# Patient Record
Sex: MALE | Race: BLACK OR AFRICAN AMERICAN | NOT HISPANIC OR LATINO | Employment: UNEMPLOYED | URBAN - METROPOLITAN AREA
[De-identification: names, ages, dates, MRNs, and addresses within clinical notes are randomized per-mention and may not be internally consistent; named-entity substitution may affect disease eponyms.]

---

## 2024-05-29 ENCOUNTER — OFFICE VISIT (OUTPATIENT)
Dept: UROLOGY | Facility: HOSPITAL | Age: 74
End: 2024-05-29

## 2024-05-29 ENCOUNTER — LAB (OUTPATIENT)
Dept: LAB | Facility: LAB | Age: 74
End: 2024-05-29
Payer: COMMERCIAL

## 2024-05-29 DIAGNOSIS — C61 PROSTATE CA (MULTI): ICD-10-CM

## 2024-05-29 DIAGNOSIS — C61 PROSTATE CA (MULTI): Primary | ICD-10-CM

## 2024-05-29 LAB — PSA SERPL-MCNC: 50.68 NG/ML

## 2024-05-29 PROCEDURE — 36415 COLL VENOUS BLD VENIPUNCTURE: CPT

## 2024-05-29 PROCEDURE — 99215 OFFICE O/P EST HI 40 MIN: CPT | Performed by: STUDENT IN AN ORGANIZED HEALTH CARE EDUCATION/TRAINING PROGRAM

## 2024-05-29 PROCEDURE — 84153 ASSAY OF PSA TOTAL: CPT

## 2024-05-29 NOTE — PROGRESS NOTES
Subjective   Patient ID: Binu Winters is a 74 y.o. male    HPI  74 y.o. male who has a history of prostate cancer in 2021 presented for follow up for his condition. In March 2024, Recent PET scan showed local disease extension into seminal vesicles, possible metastatic subcentimetric peter inguinal LNs        Review of Systems    All systems were reviewed. Anything negative was noted in the HPI.    Objective   Physical Exam    General: Well developed, well nourished, alert and cooperative, appears in no acute distress   Eyes: Non-injected conjunctiva, sclera clear, no proptosis   Cardiac: Extremities are warm and well perfused. No edema, cyanosis or pallor   Lungs: Breathing is easy, non-labored. Speaking in clear and complete sentences. Normal diaphragmatic movement   MSK: Ambulatory with steady gait, unassisted   Neuro: Alert and oriented to person, place, and time   Psych: Demonstrates good judgment and reason, without hallucinations, abnormal affect or abnormal behaviors   Skin: No obvious lesions, no rashes       No CVA tenderness bilaterally   No suprapubic pain or discomfort       No past medical history on file.      No past surgical history on file.        Assessment/Plan   Prostatic cancer, local disease progression, possible inguinal Lns, no pathology or Thawville score to review     74 y.o. male who presents for the above condition, We had a very long and extensive discussion with the patient regarding the pathophysiology, differential diagnosis, risk factor, management, natural history, incidence and diagnostic work-up of the condition. Recent PET scan showed local disease extension into seminal vesicles, possible metastatic subcentimetric peter inguinal Lns.    I reviewed with the patient the epidemiology and natural history of prostate cancer, including the different grades and stages including the Thawville scores and the newer grade group classifications. I explained to him that with his focal lesion on  MRI and we need to do a metastatic work-up. I explained to him that such disease is typically managed with either robotic assisted laparoscopic radical prostatectomy, external beam radiation, seeds implants, HIFU.  I explained to him however several other variables can come into play, including the age of the patient, the trend of PSA change, the volume of disease, the comorbidities, and the genomic profile to predict the chance of progression in the future. I explained to him that my preference is against active surveillance with delayed intervention. we discussed the risk, benefit, step-by-step procedure, adverse events, side effects, outcomes, quality of life, and survival benefit of each therapy for the prostate cancer.  More specifically discussed urinary incontinence and rectal dysfunction and oncologic outcomes for each.     Plan:  - PSA  - Fu with Rad onc for possible EBRT        5/29/2024    Scribe Attestation  By signing my name below, Naty LLOYD Scribe   attest that this documentation has been prepared under the direction and in the presence of Dr. Bo Cisneros

## 2024-05-31 ENCOUNTER — TELEPHONE (OUTPATIENT)
Dept: UROLOGY | Facility: HOSPITAL | Age: 74
End: 2024-05-31

## 2024-05-31 ENCOUNTER — TELEPHONE (OUTPATIENT)
Dept: RADIATION ONCOLOGY | Facility: CLINIC | Age: 74
End: 2024-05-31

## 2024-05-31 NOTE — TELEPHONE ENCOUNTER
----- Message from Bo Cisneros MD MPH sent at 5/30/2024 11:48 AM EDT -----  MAYAI  ----- Message -----  From: Lab, Background User  Sent: 5/29/2024  10:17 PM EDT  To: Bo Cisneros MD MPH

## 2024-05-31 NOTE — TELEPHONE ENCOUNTER
The purpose of this encounter was to know if patient can be seen without records.  Should he not be scheduled until imaging orders are in and biopsy records are in?  Appointment has been cancelled.  Please advise on this.     I called the correctional facility and the  said he got a call about this 3 times today.  Once was me and the other 2 times have not been documented.  I was transferred to his officer Tato again who said I need to ask for the medical supervisor.  I was transferred to Blanca who is on lunch and left her a VM with my direct extension and that I have cancelled the appointment.

## 2024-05-31 NOTE — TELEPHONE ENCOUNTER
LM on Jackie Raygoza's voicemail that this patient has an order for radiation/oncology, that an appointment needs to be made; call [phone] which is central scheduling.      Mary Rg LPN

## 2024-05-31 NOTE — TELEPHONE ENCOUNTER
I am from [facility] at [place] not central scheduling and Per Dr. Phma, who pt was originally scheduled by central scheduling with states pt needs to see either Dr. Carter Zapata or Dr. Mehul Ken at the [place] office.   Patient has 0 records on file per Dr. Cisneros's notes.  There is no PET CT on file nor MRI as stated in the notes.  Left multiple messages at correctional facility.  Pt needs imaging and biopsy records for consult with radiation.  Regardless needs to be rescheduled by that location.

## 2024-05-31 NOTE — TELEPHONE ENCOUNTER
[facility] is the closest  rad onc facility to [facility].    Per Dr. Pham, please cancel [place] NPV and reschedule NPV with either Dr. Carter Zapata or Dr. Mehul Ken at the [facility] office.     Thank you,  ASHLEIGH DIEZ, RN

## 2024-05-31 NOTE — TELEPHONE ENCOUNTER
Spoke with ruma Raygoza at [facility].  I informed her of the information needed per Rad/Onc office in [place];  That the Patient needs to schedule with Center office, the providers names were given to her.  All imaging and records related to this diagnosis needs to be forwarded to Center so that patient can be seen.    Mary Rg LPN

## 2024-05-31 NOTE — TELEPHONE ENCOUNTER
"Patient was scheduled by central scheduling yesterday.  He is being ref by Dr. Cisneros in urology at .  His office visit notes state no pathology or Frank score to review.   It also states he has a focal lesion on MRI and Recent PET scan showed local disease.  Imaging not on file.  Patient is at a correctional facility and I left a VM for his \"officer\".     Should I reschedule him if we are not able to reach him to find out about imaging to get it pushed into PACS and to find out if he ever had a biopsy?  Please advise.   "

## 2024-05-31 NOTE — TELEPHONE ENCOUNTER
Including SNEHAL Schroeder to make aware.  Please include her when routing new messages pertaining to this patient.    Thank you,    ASHLEIGH DIEZ RN

## 2024-05-31 NOTE — TELEPHONE ENCOUNTER
I have replied back on Mary Rg's encounter who is the LPN for Dr. Cisneros with the below information that patient needs to be rescheduled to [place] and that there are no records on file.  They will proceed forward with scheduling per their other encounter as they contacted the corrections facility again to r/s.

## 2024-06-03 ENCOUNTER — APPOINTMENT (OUTPATIENT)
Dept: RADIATION ONCOLOGY | Facility: CLINIC | Age: 74
End: 2024-06-03

## 2024-06-03 ENCOUNTER — TELEPHONE (OUTPATIENT)
Dept: RADIATION ONCOLOGY | Facility: CLINIC | Age: 74
End: 2024-06-03

## 2024-06-03 ENCOUNTER — HOSPITAL ENCOUNTER (OUTPATIENT)
Dept: RADIATION ONCOLOGY | Facility: CLINIC | Age: 74
Setting detail: RADIATION/ONCOLOGY SERIES
Discharge: HOME | End: 2024-06-03
Payer: COMMERCIAL

## 2024-06-03 VITALS
HEART RATE: 58 BPM | OXYGEN SATURATION: 98 % | DIASTOLIC BLOOD PRESSURE: 88 MMHG | TEMPERATURE: 97.5 F | RESPIRATION RATE: 18 BRPM | SYSTOLIC BLOOD PRESSURE: 168 MMHG

## 2024-06-03 DIAGNOSIS — C61 PROSTATE CANCER (MULTI): Primary | ICD-10-CM

## 2024-06-03 DIAGNOSIS — C61 PROSTATE CA (MULTI): ICD-10-CM

## 2024-06-03 PROCEDURE — 99205 OFFICE O/P NEW HI 60 MIN: CPT | Performed by: RADIOLOGY

## 2024-06-03 PROCEDURE — 99215 OFFICE O/P EST HI 40 MIN: CPT | Performed by: RADIOLOGY

## 2024-06-03 RX ORDER — ATORVASTATIN CALCIUM 80 MG/1
80 TABLET, FILM COATED ORAL NIGHTLY
COMMUNITY

## 2024-06-03 RX ORDER — CARVEDILOL 6.25 MG/1
6.25 TABLET ORAL
COMMUNITY

## 2024-06-03 RX ORDER — NAPROXEN SODIUM 220 MG/1
81 TABLET, FILM COATED ORAL DAILY
COMMUNITY

## 2024-06-03 RX ORDER — AMLODIPINE BESYLATE 10 MG/1
10 TABLET ORAL DAILY
COMMUNITY

## 2024-06-03 RX ORDER — FUROSEMIDE 40 MG/1
40 TABLET ORAL DAILY
COMMUNITY

## 2024-06-03 ASSESSMENT — ENCOUNTER SYMPTOMS
PSYCHIATRIC NEGATIVE: 1
MYALGIAS: 1
DIFFICULTY URINATING: 1
LEG SWELLING: 1
RESPIRATORY NEGATIVE: 1
GASTROINTESTINAL NEGATIVE: 1
HEMATOLOGIC/LYMPHATIC NEGATIVE: 1
FREQUENCY: 1
ENDOCRINE NEGATIVE: 1
CONSTITUTIONAL NEGATIVE: 1
NUMBNESS: 1
EYES NEGATIVE: 1

## 2024-06-03 ASSESSMENT — PAIN SCALES - GENERAL: PAINLEVEL: 0-NO PAIN

## 2024-06-03 NOTE — PROGRESS NOTES
Radiation Oncology Nursing Note    Prior Radiotherapy:  No  No radiation treatments to show. (Treatments may have been administered in another system.)     Current Systemic Treatment:  No     Presence of Pacemaker or ICD:  No    History of Autoimmune or Connective Tissue Disorders:  No    Pain: The patient's current pain level was assessed.  They report currently having a pain of 0 out of 10.  They feel their pain is under control without the use of pain medications.    Review of Systems:  Review of Systems   Constitutional: Negative.    HENT:  Negative.     Eyes: Negative.  Eye problems: Wears glasses.   Respiratory: Negative.     Cardiovascular:  Positive for leg swelling (On Lasix).   Gastrointestinal: Negative.         Moves bowels twice daily, soft and formed   Endocrine: Negative.    Genitourinary:  Positive for difficulty urinating (Intermittency), frequency and nocturia (x3).    Musculoskeletal:  Positive for myalgias (New onset R hip pain after running x1 week).   Skin: Negative.    Neurological:  Positive for numbness (Mild intermittent L hand).   Hematological: Negative.    Psychiatric/Behavioral: Negative.

## 2024-06-03 NOTE — PROGRESS NOTES
Radiation Oncology Outpatient Consult    Patient Name:  Binu Winters  MRN:  [id number]  :  1950    Primary Care Provider: No primary care provider on file.  Care Team: No care team member to display    Date of Service: 6/3/2024       SUMMARY: 74 y.o. male with high risk prostate cancer, cT3b(r) with possible inguinal LAD on PSMA PET/CT (pending review at ). PSA 50.68    SUBJECTIVE  History of Present Illness:  Binu Winters is a 74 y.o. male who was referred by Bo Cisneros for a consultation to the [facility] Department of Radiation Oncology.  He is presenting for evaluation and management of prostate cancer    Patient was being evalulated at [facility] for elevated PSA. We are working on obtaining all his records. PSMA PET/CT demonstrated T3b disease with possible inguinal LAD    Today, the patient reports feeling well overall. He denies changes in his symptoms.  His sexual Health Inventory for Men score (KALPANA) is 11. His International Prostate Symptom Score (IPSS) score is 11. He denies diarrhea or constipation. He denies bone pain.      Prior Radiotherapy:  No  No radiation treatments to show. (Treatments may have been administered in another system.)      Current Systemic Treatment:  No     Presence of Pacemaker or ICD:  No     History of Autoimmune or Connective Tissue Disorders:  No     Pain: The patient's current pain level was assessed.  They report currently having a pain of 0 out of 10.  They feel their pain is under control without the use of pain medications.     Review of Systems:  Review of Systems   Constitutional: Negative.    HENT:  Negative.     Eyes: Negative.  Eye problems: Wears glasses.   Respiratory: Negative.     Cardiovascular:  Positive for leg swelling (On Lasix).   Gastrointestinal: Negative.         Moves bowels twice daily, soft and formed   Endocrine: Negative.    Genitourinary:  Positive for difficulty urinating (Intermittency), frequency and  nocturia (x3).    Musculoskeletal:  Positive for myalgias (New onset R hip pain after running x1 week).   Skin: Negative.    Neurological:  Positive for numbness (Mild intermittent L hand).   Hematological: Negative.    Psychiatric/Behavioral: Negative.         Past Surgical History:  No past surgical history on file.     Family History:  Cancer-related family history is not on file.    Social History:       Allergies:    Allergies   Allergen Reactions    Penicillins Anaphylaxis        Medications:  No current outpatient medications on file.          Performance Status:  ECOG 1          OBJECTIVE  Physical Exam:  /88 (BP Location: Right arm, Patient Position: Sitting, BP Cuff Size: Large adult)   Pulse 58   Temp 36.4 °C (97.5 °F)   Resp 18   SpO2 98%    Constitutional: Awake, alert and oriented. No acute distress. Appears stated age.  Eyes: Conjunctivae are clear without exudates or hemorrhage. Eyelids are normal in appearance without swelling or lesions.  ENMT: mucous membranes moist, no apparent injury, no lesions seen  Neck: Neck supple, no apparent injury, trachea midline  Resp/Thorax: Patent airways,  good chest expansion. Thorax symmetric  Cardiovascular: The external chest is normal without lifts, heaves, or thrills. PMI is not visible.  GI: Nondistended, soft, non-tender.  /Gyn: Deferred  MSK: No tenderness noted on palpation of the spine. No ROM limitations.  Extremities: normal extremities, no cyanosis, erythema or edema.  Neurological: alert and oriented x3. Sensory and motor function appear intact. No gait abnormality observed.  Psych: Appropriate mood and behavior.  Skin: Warm and dry, no new lesions or rashes.      Laboratory Review:      Lab Results   Component Value Date    PSA 50.68 (H) 05/29/2024       The pertinent lab results were reviewed and discussed with the patient.      Pathology Review:  obtaining [facility] records    Imaging:  The pertinent imaging results were reviewed and  discussed with the patient.  Notably,            ASSESSMENT:  Binu Winters is a 74 y.o. male with high risk prostate cancer    COUNSELING AND COORDINATION OF CARE:  The natural history of prostate cancer was reviewed with the patient. Prognostic factors including the following were discussed: clinical staging, radiologic findings, pretreatment PSA, GS/grade group on biopsy, and the number of biopsy cores involved with cancer, cancer volume, and the patient's performance status/comorbidities.    The patient's the clinical presentation, PSA lab findings and imaging findings were presented. Based on his risk factors, he would be classified as high risk.    We discussed with the patient treatment options including radical prostatectomy, radiation therapy including hypofractionated/ultrahypofractionated radiation therapy with long term androgen deprivation therapy  Potential benefit, side effects and complications of each treatment was discussed.    The indications, benefits, side effects and complications of radiotherapy, which include both acute and late side effects were highlighted. Acute: Fatigue, dysuria, frequency, urine retention, rectal urgency, diarrhea. Late: Stricture, cystitis, proctitis, sexual dysfunction, second malignancy have all been discussed in detail with the patient. All questions were answered to the patient´s satisfaction.    We discussed the role of fiducials and SpaceOAR for radiotherapy to the prostate.  We also discussed the benefit of SpaceOAR for all patients undergoing radiation therapy to help spare rectal toxicity, , especially for treatment with SBRT.     The patient had all questions answered and concerns addressed during the visit. The patient appears to be leaning towards radiotherapy at this time and would like to initiate preparation for treatment.     PLAN:    Radiation Intent to treat orders have been placed in Epic   Will Refer the patient to Dr Bartlett/Benny at [facility] to  receive radiation closer to [place]  Will place referral for medical oncology  Will obtain [facility] imaging and path        NCCN Guidelines were applicable to guide this patients treatment plan.   Mirza Pham MD       No future appointments.

## 2024-06-03 NOTE — TELEPHONE ENCOUNTER
"This patient came to us without records.       Dr. Pham would like for you to contact [facility] to have the PSMA PET/CT pushed into PACS.  It looks like it was imaging was completed on 3/7/24.     Dr. Pham would also like this patient scheduled as a NPV with [facility] [place].  He will likely go to [place] for radiation treatments.     Per [facility] rules, \"Schedule any future/follow up appointments by contacting:    [facility] Central [phone] opt. 3\"    Scheduling Lead: Eileen [place]  Email: [email or web address]  Phone: [phone] opt. 3  Fax: [phone]    Central Scheduling Email: [email or web address]    Schedulers:  Jackie Raygoza: [phone]  Jovana Hyman: [phone]  Diann Sheth: [phone]  Elif Menjivar: [phone]  Vidhya Castaneda: [phone]    Case Management: [phone]      Thank you,  ASHLEIGH DIEZ RN        "

## 2024-06-03 NOTE — TELEPHONE ENCOUNTER
Per Dr. Pham, patient needs to be seen in [place] with Dr. Zapata or Dr. Bartlett, as [place] is closer.     ASHLEIGH DIEZ RN

## 2024-06-04 ENCOUNTER — PATIENT OUTREACH (OUTPATIENT)
Dept: HEMATOLOGY/ONCOLOGY | Facility: HOSPITAL | Age: 74
End: 2024-06-04
Payer: COMMERCIAL

## 2024-06-04 NOTE — PROGRESS NOTES
06/04/2024 11:29AM Referral received from Dr. Pham for patient to meet with med/onc for his prostate cancer. Only information is via a few records in the sytem. Notes state he was diagnosed in 2021, possibly at [facility] yet, nothing populated in EPIC. Have reached out manually for information. Most recently he had a PET [facility] at [facility] in March of 2024. PSA from May was 50.68. Have requested his records from [facility] and PSMA PET report and image from [facility] as the one in EPIC is hard to read. He is scheduled for 06/06/2024 @ 4:00PM with Dr. Arana. Justin Dominguez RN.     06/06/2024 0606A: Received records from [facility] for Mr. Winters. He did undergo prostate biopsy 11/11/19, pathology showed [place] 8 (4+4, 3+5) disease. PSA at diagnosis was 27.85. Records have been scanned into EPIC. It is unclear from the records received whether or not patient was treated for his prostate cancer at his follow-up visit. Justin Dominguez RN.

## 2024-06-19 ENCOUNTER — TELEPHONE (OUTPATIENT)
Dept: RADIATION ONCOLOGY | Facility: CLINIC | Age: 74
End: 2024-06-19
Payer: COMMERCIAL

## 2024-06-19 NOTE — TELEPHONE ENCOUNTER
"Left VM at [facility] [phone] opt. 3\"     Scheduling Lead: Eileen [place]  Email: [email or web address]  Phone: [phone] opt. 3  Fax: [phone]    To schedule referral to medical oncology.    "

## 2024-06-20 NOTE — TELEPHONE ENCOUNTER
All the numbers randy provided for scheduling including below go directly to .  Left  again and let Cruz at [facility] know to get pt scheduled for hematology/oncology referral when pt comes for his 7/1 appt.

## 2024-06-22 NOTE — PROGRESS NOTES
Oncology New Patient Visit  Patient ID: Binu Winters is a 74 y.o. male who presents today to [facility] care.  Referring Physician: Mirza Pham MD  [id number]  [place] [address]  Primary Care Provider: No Assigned PCP Generic Provider, MD Mirza Pham rad onc  [facility]    [facility]      Cancer History  Prostate Cancer  - stage TBD  Treatment  -dx in 2019/2020, limited records, PSA in the 20 range biopsy showing grade group 2, grade group 4, grade group 3 did not have diagnostic imaging at that time decision to monitor  -Rising PSA now up to 57 PET scan done in March showing possible extension from the prostate into the seminal vesicles nonspecific renal lymphadenopathy with SUV 3-4 range    Other contributing history  CHF, hypertension, hyperlipidemia, erectile dysfunction, alcohol abuse, CAD,     HPI  Referred by provider/s above.  The patient is accompanied by guards and is without any other major new complaints.  Is any ongoing issues with nausea, vomiting, fevers, chills, easy bruising or bleeding denies any issues with chest pain or chest tightness denies any melena or bright red blood per rectum.  Appetite and energy level are stable.  Denies any worsening issues with muscle aches or tenderness.     ROS:  10-pt ROS reviewed and negative except as mentioned above.    Medications: below was reviewed and is accurate  Current Outpatient Medications   Medication Instructions    amLODIPine (NORVASC) 10 mg, oral, Daily    aspirin 81 mg, oral, Daily    atorvastatin (LIPITOR) 80 mg, oral, Nightly    carvedilol (COREG) 6.25 mg, oral, 2 times daily (morning and late afternoon)    furosemide (LASIX) 40 mg, oral, Daily        Past Medical History  No past medical history on file.  HTN/ HLD/ CAD /    FamilyHistory  No family history on file.   Past Surgical History  No past surgical history on file.  Right foot /  "vicente    Social History  Single / [facility]  Etoh abuse    He  reports that he does not currently use alcohol.  He  reports no history of drug use.    Physical exam:  Vitals:    06/24/24 1408   BP: 160/88   BP Location: Right arm   Patient Position: Sitting   BP Cuff Size: Adult long   Pulse: 60   Resp: 18   Temp: 35.9 °C (96.6 °F)   TempSrc: Temporal   SpO2: 99%   Weight: 99.3 kg (218 lb 14.7 oz)   Height: (S) 1.705 m (5' 7.13\")     GEN: NAD, well-appearing sitting in a wheel chair  HEENT: no clear lesions seen  NECK: no clear masses  LYMPH: no palpable adenopathy  SKIN: no concerning new lesions  LUNGS: CTA  CV: RRR no clear R/G  ABD: Soft, NTND. No rebound or guarding.  EXT:  trace edema bilaterally   NEURO: Grossly intact. No focal deficits.  PSYCH: Normal mood and affect    Radiology review  Reviewed in detail personally and for detail see results in EPIC        Genomics Data    Laboratory review  Reviewed in detail personally and for detail see results in EPIC            ASSESSMENT AND PLAN   We had a long discussion regarding the natural history, prognosis, and potential treatment options for his disease.  We discussed timing of therapy and next line standard options as well as clinical trial options for his current disease state. Discussed also NCCN guidlines as per the patients diagnosis and treatment options.  The Total Visit includes the following :  face to face time during the visit today if in person, phone / virtual time with the patient,  review of records, including office notes, pathology, radiology, labs, and prior treatments and care coordination. This review directly influenced my assessment and recommendations for this visit.    Prostate cancer- discussed in detail concern for high risk disease neal the need for a repeat PET scan to further evaluate the extent of his disease.  Discussed in detail options at this juncture including surgery, definitive " radiation with the use of ADT discussed multiple ADT options including Relugolix potential role of other agents such as the addition of adding novel hormonal agent such as abiraterone and prednisone.  Risks benefits discussed in detail.  Discussed natural history of this cancer and treatment options.  Discussed ASE and rba of ADT therapy including hot flashes, bone density changes, body habitus changes and loss of muscle mass, loss of libido, and ED.  We also discussed orcheictomy as an alternative to medical castration.  He agrees to start with medical castration for now and discussed the need to maintain adequate cardiovascular health, exercise, bone health with calcium 1000 mg with vitamin D 400-800 international units.  I do have some concerns with his last PET scan being close to 3 months and his PSA continue to rise.  After extensive discussion he still has not had follow-up with radiation oncology and is scheduled to follow-up with them fairly soon.  I recommendation was to go ahead and get follow-up PET scan and he is scheduled already for follow-up with radiation oncology and we will arrange follow-up then afterwards next week to discuss findings discussed likely the backbone of treatment would be ADT will need to work out logistics if found to have high risk features for potential kwaku involvement about how to get the abiraterone approved.  And be able to transported back and forth to the facility.We will arrange follow-up and then discuss next steps.        James Stahl MD  Senior Attending Physician/  in Medicine [facility] [employer]  [facility]  Patient line [phone]  Fax [phone]  \

## 2024-06-24 ENCOUNTER — OFFICE VISIT (OUTPATIENT)
Dept: HEMATOLOGY/ONCOLOGY | Facility: CLINIC | Age: 74
End: 2024-06-24
Payer: COMMERCIAL

## 2024-06-24 VITALS
BODY MASS INDEX: 34.36 KG/M2 | SYSTOLIC BLOOD PRESSURE: 160 MMHG | RESPIRATION RATE: 18 BRPM | DIASTOLIC BLOOD PRESSURE: 88 MMHG | WEIGHT: 218.92 LBS | OXYGEN SATURATION: 99 % | HEIGHT: 67 IN | TEMPERATURE: 96.6 F | HEART RATE: 60 BPM

## 2024-06-24 DIAGNOSIS — C61 PROSTATE CANCER (MULTI): ICD-10-CM

## 2024-06-24 PROCEDURE — 99215 OFFICE O/P EST HI 40 MIN: CPT | Performed by: INTERNAL MEDICINE

## 2024-06-24 PROCEDURE — 1159F MED LIST DOCD IN RCRD: CPT | Performed by: INTERNAL MEDICINE

## 2024-06-24 PROCEDURE — 99205 OFFICE O/P NEW HI 60 MIN: CPT | Performed by: INTERNAL MEDICINE

## 2024-06-24 PROCEDURE — 1036F TOBACCO NON-USER: CPT | Performed by: INTERNAL MEDICINE

## 2024-06-24 ASSESSMENT — COLUMBIA-SUICIDE SEVERITY RATING SCALE - C-SSRS
6. HAVE YOU EVER DONE ANYTHING, STARTED TO DO ANYTHING, OR PREPARED TO DO ANYTHING TO END YOUR LIFE?: NO
2. HAVE YOU ACTUALLY HAD ANY THOUGHTS OF KILLING YOURSELF?: NO
1. IN THE PAST MONTH, HAVE YOU WISHED YOU WERE DEAD OR WISHED YOU COULD GO TO SLEEP AND NOT WAKE UP?: NO

## 2024-06-24 ASSESSMENT — ENCOUNTER SYMPTOMS
LOSS OF SENSATION IN FEET: 0
DEPRESSION: 0
OCCASIONAL FEELINGS OF UNSTEADINESS: 0

## 2024-06-24 ASSESSMENT — PATIENT HEALTH QUESTIONNAIRE - PHQ9
1. LITTLE INTEREST OR PLEASURE IN DOING THINGS: NOT AT ALL
2. FEELING DOWN, DEPRESSED OR HOPELESS: NOT AT ALL
SUM OF ALL RESPONSES TO PHQ9 QUESTIONS 1 AND 2: 0

## 2024-06-24 NOTE — PROGRESS NOTES
Patient here for new patient consult.   History of prostate Cancer and new elevation in PSA.  Follow up re options and review of case.     Oriented to this facility and information given re how to contact this facility and physician.     Patient here with armed guards as required in this situation.   Hand outs re prescribed treatment given to patient.     Medications and Allergies reviewed and reconciled this visit.    Follow up per MD request.    Patient reports currently not using mychart, Reviewed this is a good place to communicate with the team as well as review labs and upcoming orders.      No barriers to education noted, patient agrees to current plan and verbalized understanding using teach back method.

## 2024-06-28 PROBLEM — C61 PROSTATE CANCER (MULTI): Status: RESOLVED | Noted: 2024-06-03 | Resolved: 2024-06-28

## 2024-07-01 ENCOUNTER — APPOINTMENT (OUTPATIENT)
Dept: RADIATION ONCOLOGY | Facility: CLINIC | Age: 74
End: 2024-07-01
Payer: COMMERCIAL

## 2024-07-01 ENCOUNTER — TELEPHONE (OUTPATIENT)
Dept: HEMATOLOGY/ONCOLOGY | Facility: CLINIC | Age: 74
End: 2024-07-01
Payer: COMMERCIAL

## 2024-07-01 NOTE — TELEPHONE ENCOUNTER
Attempted to call back as PET and f/up not arranged  [facility] schedule as he was due for a PET scan for an updated PET scan since it has been 3 months and to see us today in the clinic follow-up appointments were not scheduled.  Did leave a message at .

## 2024-07-03 ENCOUNTER — APPOINTMENT (OUTPATIENT)
Dept: RADIATION ONCOLOGY | Facility: CLINIC | Age: 74
End: 2024-07-03
Payer: COMMERCIAL

## 2024-07-08 ENCOUNTER — HOSPITAL ENCOUNTER (OUTPATIENT)
Dept: RADIOLOGY | Facility: HOSPITAL | Age: 74
Discharge: HOME | End: 2024-07-08
Payer: COMMERCIAL

## 2024-07-08 DIAGNOSIS — C61 PROSTATE CANCER (MULTI): ICD-10-CM

## 2024-07-08 PROCEDURE — 78815 PET IMAGE W/CT SKULL-THIGH: CPT | Mod: PET TUMOR SUBSQ TX STRATEGY | Performed by: STUDENT IN AN ORGANIZED HEALTH CARE EDUCATION/TRAINING PROGRAM

## 2024-07-08 PROCEDURE — A9800 HC RX 343 DIAGNOSTIC RADIOPHARMACEUTICALS: HCPCS | Performed by: INTERNAL MEDICINE

## 2024-07-08 PROCEDURE — 78815 PET IMAGE W/CT SKULL-THIGH: CPT | Mod: PS

## 2024-07-08 PROCEDURE — 3430000001 HC RX 343 DIAGNOSTIC RADIOPHARMACEUTICALS: Performed by: INTERNAL MEDICINE

## 2024-07-11 ENCOUNTER — HOSPITAL ENCOUNTER (OUTPATIENT)
Dept: RADIOLOGY | Facility: EXTERNAL LOCATION | Age: 74
Discharge: HOME | End: 2024-07-11

## 2024-07-11 DIAGNOSIS — C61 PROSTATE CANCER (MULTI): ICD-10-CM

## 2024-07-12 ENCOUNTER — HOSPITAL ENCOUNTER (OUTPATIENT)
Dept: RADIATION ONCOLOGY | Facility: CLINIC | Age: 74
Setting detail: RADIATION/ONCOLOGY SERIES
Discharge: HOME | End: 2024-07-12
Payer: COMMERCIAL

## 2024-07-12 VITALS
SYSTOLIC BLOOD PRESSURE: 193 MMHG | WEIGHT: 224.1 LBS | DIASTOLIC BLOOD PRESSURE: 93 MMHG | RESPIRATION RATE: 16 BRPM | HEART RATE: 69 BPM | OXYGEN SATURATION: 98 % | TEMPERATURE: 97.7 F | BODY MASS INDEX: 34.97 KG/M2

## 2024-07-12 DIAGNOSIS — C61 PROSTATE CANCER (MULTI): Primary | ICD-10-CM

## 2024-07-12 PROCEDURE — 99214 OFFICE O/P EST MOD 30 MIN: CPT | Performed by: RADIOLOGY

## 2024-07-12 SDOH — ECONOMIC STABILITY: TRANSPORTATION INSECURITY
IN THE PAST 12 MONTHS, HAS THE LACK OF TRANSPORTATION KEPT YOU FROM MEDICAL APPOINTMENTS OR FROM GETTING MEDICATIONS?: NO

## 2024-07-12 SDOH — ECONOMIC STABILITY: TRANSPORTATION INSECURITY
IN THE PAST 12 MONTHS, HAS LACK OF TRANSPORTATION KEPT YOU FROM MEETINGS, WORK, OR FROM GETTING THINGS NEEDED FOR DAILY LIVING?: NO

## 2024-07-12 ASSESSMENT — SOCIAL DETERMINANTS OF HEALTH (SDOH): HOW HARD IS IT FOR YOU TO PAY FOR THE VERY BASICS LIKE FOOD, HOUSING, MEDICAL CARE, AND HEATING?: NOT HARD AT ALL

## 2024-07-12 ASSESSMENT — PAIN SCALES - GENERAL: PAINLEVEL: 0-NO PAIN

## 2024-07-12 NOTE — PROGRESS NOTES
Patient seen for follow up for prostate cancer, KALPANA 2, IPSS 1 for weak stream. Patient prepared for CT/Simulation appointment. Per Dr. Bartlett patient to have MRI before radiation treatment begins.

## 2024-07-12 NOTE — PROGRESS NOTES
Radiation Oncology Follow-Up    Patient Name:  Binu Winters  MRN:  [id number]  :  1950    Referring Provider: No ref. provider found  Primary Care Provider: No Assigned PCP Generic Provider, MD  Care Team: Patient Care Team:  No Assigned Pcp Generic Provider, MD as PCP - General (General Practice)  James Stahl MD as Consulting Physician (Hematology and Oncology)    Date of Service: 2024    SUBJECTIVE  History of Present Illness:  Binu Winters is a 74 y.o. male who was previously seen at the [facility] Department of Radiation Oncology for prostate ca.    Mr Henderson had seen Dr Pham for his prostate ca in 2024, and he was referred to [facility] for RT. Since meeting Dr Pham, he has had a PSMA PET, report below. Today he feels well. He is accompanied by 2 guards. KALPANA 2. IPSS 1. QOL 1. On my review of the PET, there is no obvious infiltration of the rectum. The inguinal LN that was previously noted seems to be a red herring -- it is not reported as disease and appears normal.    Treatment Rendered:   No radiation treatments to show. (Treatments may have been administered in another system.)       Review of Systems:   Review of Systems - Oncology    Performance Status:   The Karnofsky performance scale today is 100, Fully active, able to carry on all pre-disease performed without restriction (ECOG equivalent 0).   General: Negative for weight changes, night sweats, fever, recent trauma.   Eyes: Negative for blurriness, eye pain, blind spots.   Ears, nose, mouth, throat: Negative for changes in hearing, changes in taste, sore throat, mouth sores.  Cardiovascular: Negative for palpitations, chest pain, tightness.   Pulmonary: Negative for shortness of breath, cough, sputum.   Gastrointestinal: Negative for indigestion, diarrhea, constipation, abdominal pain, blood in stool, nausea/vomiting.   Genitourinary: Per HPI.   Neuro: Negative for numbness,  tingling, weakness, changes in speech.   Musculoskeletal: Negative for muscle pain, joint pain, edema, tenderness.   Endocrine: Negative for hot flashes.   Other: All others negative.      OBJECTIVE  Vital Signs:  BP (!) 193/93 (BP Location: Left arm, Patient Position: Sitting, BP Cuff Size: Large adult)   Pulse 69   Temp 36.5 °C (97.7 °F) (Temporal)   Resp 16   Wt 102 kg (224 lb 1.6 oz)   SpO2 98%   BMI 34.97 kg/m²   Physical Exam   General: The patient appears well and is in no acute distress.   Cognition: Acceptable understanding of the essential elements of the medical situation.   Speech: Fluent and coherent.   Eyes: No conjunctival infection. Anicteric. Extraocular movements intact.   Head, ears, nose, mouth, throat: Atraumatic. Moist mucous membranes. Trachea midline.   Lymphatic: No visible cervical lymphadenopathy.   Cardiovascular: No visible jugular venous distention. Skin perfused.   Pulmonary: Breathes comfortably on room air without stridor or cough.   Abdomen: Nondistended.   Extremities: No edema or muscle wasting.   Neurologic: Cranial nerves II-XII are grossly intact. Moves all extremities. No gross focal deficits.   Psychiatric: Mood is appropriate.   Skin: No visible rash or ulcers.       Imaging    1. Intense Ga68 PSMA uptake involving the majority of the prostate  gland, with extension into the seminal vesicles, consistent with  known prostate cancer with local spread. PSMA-RADS-5 questionable  extension of Ga68 PSMA uptake within the anterolateral wall of the  rectum, with likely obliteration of the fat planes, although  incompletely evaluated on low-dose CT. Recommend correlation with MRI  of the pelvis to rule out rectal involvement.  2. No PET evidence of kwaku or distant metastasis      Pathology      ASSESSMENT:   Binu Winters is a 74 y.o. male with prostate cancer. Stage III cT2 (rT3a/b) N0 M0 GG4 6/6 cores PSA 50.     PLAN:    We will plan to proceed with CT simulation for  prostate ca today.    We reviewed the logistics of the treatment.    He is seeing Dr Stahl next week, with tentative plan to start ADT about 1 week later.    I am ordering an MRI prostate since he hasn't had this before, for staging, and to use for RT planning.    Mehul Bartlett MD MS   Department of Radiation Oncology

## 2024-07-12 NOTE — PROGRESS NOTES
Radiation Oncology Nursing Note    Pain: The patient's current pain level was assessed.  They report currently having a pain of 0 out of 10.  They feel their pain is under control without the use of pain medications.    Review of Systems:  Review of Systems - Oncology

## 2024-07-15 ENCOUNTER — OFFICE VISIT (OUTPATIENT)
Dept: HEMATOLOGY/ONCOLOGY | Facility: CLINIC | Age: 74
End: 2024-07-15
Payer: COMMERCIAL

## 2024-07-15 VITALS
TEMPERATURE: 95.9 F | HEART RATE: 51 BPM | WEIGHT: 223.11 LBS | DIASTOLIC BLOOD PRESSURE: 92 MMHG | SYSTOLIC BLOOD PRESSURE: 167 MMHG | BODY MASS INDEX: 34.81 KG/M2 | OXYGEN SATURATION: 99 % | RESPIRATION RATE: 16 BRPM

## 2024-07-15 DIAGNOSIS — C61 PROSTATE CANCER (MULTI): Primary | ICD-10-CM

## 2024-07-15 PROCEDURE — 99214 OFFICE O/P EST MOD 30 MIN: CPT | Performed by: INTERNAL MEDICINE

## 2024-07-15 PROCEDURE — 1159F MED LIST DOCD IN RCRD: CPT | Performed by: INTERNAL MEDICINE

## 2024-07-15 PROCEDURE — 1126F AMNT PAIN NOTED NONE PRSNT: CPT | Performed by: INTERNAL MEDICINE

## 2024-07-15 RX ORDER — FAMOTIDINE 10 MG/ML
20 INJECTION INTRAVENOUS ONCE AS NEEDED
OUTPATIENT
Start: 2024-07-23

## 2024-07-15 RX ORDER — DIPHENHYDRAMINE HYDROCHLORIDE 50 MG/ML
50 INJECTION INTRAMUSCULAR; INTRAVENOUS AS NEEDED
OUTPATIENT
Start: 2024-07-23

## 2024-07-15 RX ORDER — ALBUTEROL SULFATE 0.83 MG/ML
3 SOLUTION RESPIRATORY (INHALATION) AS NEEDED
OUTPATIENT
Start: 2024-07-23

## 2024-07-15 RX ORDER — EPINEPHRINE 0.3 MG/.3ML
0.3 INJECTION SUBCUTANEOUS EVERY 5 MIN PRN
OUTPATIENT
Start: 2024-07-23

## 2024-07-15 ASSESSMENT — PATIENT HEALTH QUESTIONNAIRE - PHQ9
2. FEELING DOWN, DEPRESSED OR HOPELESS: NOT AT ALL
1. LITTLE INTEREST OR PLEASURE IN DOING THINGS: NOT AT ALL
SUM OF ALL RESPONSES TO PHQ9 QUESTIONS 1 AND 2: 0

## 2024-07-15 ASSESSMENT — COLUMBIA-SUICIDE SEVERITY RATING SCALE - C-SSRS
2. HAVE YOU ACTUALLY HAD ANY THOUGHTS OF KILLING YOURSELF?: NO
1. IN THE PAST MONTH, HAVE YOU WISHED YOU WERE DEAD OR WISHED YOU COULD GO TO SLEEP AND NOT WAKE UP?: NO
6. HAVE YOU EVER DONE ANYTHING, STARTED TO DO ANYTHING, OR PREPARED TO DO ANYTHING TO END YOUR LIFE?: NO

## 2024-07-15 ASSESSMENT — PAIN SCALES - GENERAL: PAINLEVEL: 0-NO PAIN

## 2024-07-15 NOTE — PROGRESS NOTES
Patient here for follow prostate cancer 3 weeks post PET scan.    No concerns or complaints noted at this time.     Patient here with armed guards per protocol.     Medications and Allergies reviewed and reconciled this visit.    Follow up per MD request. Scheduled follow up for the patient and guards retuning schedule to the facility to determine transport.   Copy of AVS printed.  Information re Eligard and abiraterone reviewed and given.     Patient does not have access to Mychart due to current circumstance. Copy of after visit reports given to Guards per protocol.     No barriers to education noted, patient agrees to current plan and verbalized understanding using teach back method.

## 2024-07-15 NOTE — PROGRESS NOTES
Oncology New Patient Visit  Patient ID: Binu Winters is a 74 y.o. male who presents today to [facility] care.  Referring Physician: No referring provider defined for this encounter.  Primary Care Provider: No Assigned PCP Generic Provider, MD Mirza Pham rad onc  [facility]    [place] [id number] [phone]    Cancer History  Prostate Cancer  - Very High risk -   Treatment  -dx in 2019/2020, limited records, PSA in the 20 range biopsy showing grade group 2, grade group 4, grade group 3 did not have diagnostic imaging at that time decision to monitor  -Rising PSA now up to 57 PET scan done in March showing possible extension from the prostate into the seminal vesicles nonspecific renal lymphadenopathy with SUV 3-4 range  -PSA 5/29/24 50.68  -PET PSMA - 7/8/24 -intense uptake involving the majority of the prostate gland with extension into the seminal vesicles questionable extension within the anterior lateral wall of the rectum with likely obliteration of the flap planes no other evidence of PET distant metastases    Other contributing history  CHF, hypertension, hyperlipidemia, erectile dysfunction, alcohol abuse, CAD,     HPI  Presents for follow up.  He is overall doing well without any other major new symptoms.  Denies any worsening issues with nausea, vomiting, fevers, chills, easy bruising or bleeding denies any chest pain or chest tightness appetite and energy is otherwise stable    ROS:  10-pt ROS reviewed and negative except as mentioned above.    Medications: below was reviewed and is accurate  Current Outpatient Medications   Medication Instructions    amLODIPine (NORVASC) 10 mg, oral, Daily    aspirin 81 mg, oral, Daily    atorvastatin (LIPITOR) 80 mg, oral, Nightly    carvedilol (COREG) 6.25 mg, oral, 2 times daily (morning and late afternoon)    furosemide (LASIX) 40 mg, oral, Daily        Past Medical History / social surgical family reviewed and updated    Physical  exam:  Vitals:    07/15/24 1443   BP: (!) 167/92   BP Location: Right arm   Patient Position: Sitting   BP Cuff Size: Adult long   Pulse: 51   Resp: 16   Temp: 35.5 °C (95.9 °F)   TempSrc: Temporal   SpO2: 99%   Weight: 101 kg (223 lb 1.7 oz)       GEN: NAD, well-appearing sitting in a wheel chair in hand cuffs  HEENT: no clear lesions seen  NECK: no clear masses  LYMPH: no palpable adenopathy  SKIN: no concerning new lesions  LUNGS: CTA  CV: RRR no clear R/G  ABD: Soft, NTND. No rebound or guarding.  EXT:  trace edema bilaterally   NEURO: Grossly intact. No focal deficits.  PSYCH: Normal mood and affect    Radiology review  Reviewed in detail personally and for detail see results in EPIC        Genomics Data    Laboratory review  Reviewed in detail personally and for detail see results in [facility]      ASSESSMENT AND PLAN     Prostate cancer-discussed in detail does have very aggressive locally disease with no evidence of distant metastases will get some blood work next week when getting ADT and discussed the backbone of treatment with radiation and radiation planning is also ongoing discussed also the role of ADT risk benefits and alternatives and complications we will arrange for ADT to start next week.  Also discussed the potential role of adding abiraterone and prednisone based on his PSA based on high risk features not quite sure how we can facilitate and and work with coordinating throughout the present system to get access to the medication.  Did also update radiation oncology regarding PET scan findings and rationale for further MRI testing and workup prior to plan moving forward.  All options discussed in detail with the patient we will arrange for ADT, recommendation was also given for calcium 1000 mg and vitamin D and will arrange follow-up in roughly 1 month with labs prior.  Continue to monitor dose-limiting toxicities.  Hypertension-continue to monitor closely and follow-up with primary team at the senior living  system.    discussed need while on ADT  maintain adequate cardiovascular health, exercise, dietary modifications,  bone health with calcium 1000 mg with vitamin D 400-800 international units        James Stahl MD  Senior Attending Physician/  in Medicine [facility] [employer]  [facility]  Patient line [phone]  Fax [phone]  \

## 2024-07-18 ENCOUNTER — TELEPHONE (OUTPATIENT)
Dept: HEMATOLOGY/ONCOLOGY | Facility: CLINIC | Age: 74
End: 2024-07-18
Payer: COMMERCIAL

## 2024-07-18 ENCOUNTER — APPOINTMENT (OUTPATIENT)
Dept: RADIOLOGY | Facility: HOSPITAL | Age: 74
End: 2024-07-18
Payer: COMMERCIAL

## 2024-07-18 NOTE — TELEPHONE ENCOUNTER
Discussed and nursing team prefers doctor to doctor communication  Updated number and given information  Doctor will call back to discuss  Planned Eligard 45mg injection subcutaneous

## 2024-07-18 NOTE — TELEPHONE ENCOUNTER
Spoke to Enma.   At [facility] wants to administer the Eligard there.  Is tht acceptable to Dr. Stahl?  I will discuss with Dr. Stahl.  If so, fax orders to [phone].

## 2024-07-19 ENCOUNTER — TELEPHONE (OUTPATIENT)
Dept: HEMATOLOGY/ONCOLOGY | Facility: CLINIC | Age: 74
End: 2024-07-19
Payer: COMMERCIAL

## 2024-07-23 ENCOUNTER — HOSPITAL ENCOUNTER (OUTPATIENT)
Dept: RADIOLOGY | Facility: HOSPITAL | Age: 74
Discharge: HOME | End: 2024-07-23
Payer: COMMERCIAL

## 2024-07-23 DIAGNOSIS — C61 PROSTATE CANCER (MULTI): ICD-10-CM

## 2024-07-24 ENCOUNTER — APPOINTMENT (OUTPATIENT)
Dept: HEMATOLOGY/ONCOLOGY | Facility: CLINIC | Age: 74
End: 2024-07-24
Payer: COMMERCIAL

## 2024-07-26 ENCOUNTER — APPOINTMENT (OUTPATIENT)
Dept: RADIOLOGY | Facility: HOSPITAL | Age: 74
End: 2024-07-26
Payer: COMMERCIAL

## 2024-08-07 ENCOUNTER — HOSPITAL ENCOUNTER (OUTPATIENT)
Dept: RADIATION ONCOLOGY | Facility: CLINIC | Age: 74
Setting detail: RADIATION/ONCOLOGY SERIES
Discharge: HOME | End: 2024-08-07
Payer: COMMERCIAL

## 2024-08-07 PROCEDURE — 77338 DESIGN MLC DEVICE FOR IMRT: CPT | Performed by: RADIOLOGY

## 2024-08-07 PROCEDURE — 77301 RADIOTHERAPY DOSE PLAN IMRT: CPT | Performed by: RADIOLOGY

## 2024-08-07 PROCEDURE — 77300 RADIATION THERAPY DOSE PLAN: CPT | Performed by: RADIOLOGY

## 2024-08-18 NOTE — PROGRESS NOTES
Oncology New Patient Visit  Patient ID: Binu Winters is a 74 y.o. male who presents today to [facility] care.  Referring Physician: James Stahl [place]  [id number] [place] [id number]  Primary Care Provider: No Assigned PCP Generic Provider, MD Mirza Pham rad onc  [facility]    [facility]      Cancer History  Prostate Cancer  - Very High risk -   Treatment  -dx in 2019/2020, limited records, PSA in the 20 range biopsy showing grade group 2, grade group 4, grade group 3 did not have diagnostic imaging at that time decision to monitor  -Rising PSA now up to 57 PET scan done in March showing possible extension from the prostate into the seminal vesicles nonspecific renal lymphadenopathy with SUV 3-4 range  -PSA 5/29/24 50.68  -PET PSMA - 7/8/24 -intense uptake involving the majority of the prostate gland with extension into the seminal vesicles questionable extension within the anterior lateral wall of the rectum with likely obliteration of the flap planes no other evidence of PET distant metastases  -[facility]ed [facility]T [facility] at [facility] injection 7/18/24 Eligard for 2 yrs, discussing marcos/pred  -XRT starting 8/19/24    Other contributing history  CHF, hypertension, hyperlipidemia, erectile dysfunction, alcohol abuse, CAD,     HPI  Presents for follow up.  The patient notes no major complications from the hormone shot he is accompanied by his guards from the [facility].  He tolerated the hormone shot without a any major side effects.  Denies any nausea, vomiting, fevers, chills, easy bruising or bleeding denies any other worsening urinary symptoms.  He is planning on starting radiation today    ROS:  10-pt ROS reviewed and negative except as mentioned above.    Medications: below was reviewed and is accurate  Current Outpatient Medications   Medication Instructions    amLODIPine (NORVASC) 10 mg, oral, Daily    aspirin 81 mg, oral, Daily     "atorvastatin (LIPITOR) 80 mg, oral, Nightly    carvedilol (COREG) 6.25 mg, oral, 2 times daily (morning and late afternoon)    furosemide (LASIX) 40 mg, oral, Daily        Past Medical History / social surgical family reviewed and updated    Physical exam:  There were no vitals filed for this visit.      GEN: NAD, well-appearing sitting in a wheel chair in hand cuffs  HEENT: no clear lesions seen  NECK: no clear masses  LYMPH: no palpable adenopathy  SKIN: no concerning new lesions  LUNGS: CTA  CV: RRR no clear R/G  ABD: Soft, NTND. No rebound or guarding.  EXT:  trace edema bilaterally   NEURO: Grossly intact. No focal deficits.  PSYCH: Normal mood and affect    Radiology review  Reviewed in detail personally and for detail see results in EPIC        Genomics Data    Laboratory review  Reviewed in detail personally and for detail see results in [facility]  Lab Results   Component Value Date    WBC 5.9 08/19/2024    HGB 13.3 (L) 08/19/2024    HCT 42.4 08/19/2024    MCV 87 08/19/2024     08/19/2024     Lab Results   Component Value Date    GLUCOSE 70 (L) 08/19/2024    CALCIUM 9.4 08/19/2024     08/19/2024    K 3.8 08/19/2024    CO2 26 08/19/2024     08/19/2024    BUN 15 08/19/2024    CREATININE 1.07 08/19/2024     Lab Results   Component Value Date    PSA 50.68 (H) 05/29/2024     Lab Results   Component Value Date     (H) 08/19/2024    AST 74 (H) 08/19/2024    ALKPHOS 74 08/19/2024    BILITOT 1.1 08/19/2024     No results found for: \"TESTOSTERONE\"        ASSESSMENT AND PLAN     Prostate cancer-PSA and testosterone are pending from today tolerated the shot fairly well receiving radiation starting today,   See below regarding liver function test elevation would not believe related to the ADT he is on a statin.  Bigger concern is discussed the potential addition based on his high-grade features the role of abiraterone and prednisone potential risk benefits and alternatives in the setting of his liver " function test would be hard to proceed for now I did discuss and contact the primary doctor at the [facility] and they will get a follow-up CMP later this week and monitor and hold off on the abiraterone and prednisone for now continue to monitor and arrange follow[facility] his next dose of ADT would be due in January 2025.  Elevated liver function test-concern exists potentially related to underlying statin less concern for recent ADT follow-up summarized above may limit the use of abiraterone and prednisone  Hypertension-continue to monitor closely and follow-up with primary team at the senior living system.    discussed need while on ADT  maintain adequate cardiovascular health, exercise, dietary modifications,  bone health with calcium 1000 mg with vitamin D 400-800 international units        James Stahl MD  Senior Attending Physician/  in Medicine [facility] [employer]  [facility]  Patient line [phone]  Fax [phone]  \

## 2024-08-19 ENCOUNTER — HOSPITAL ENCOUNTER (OUTPATIENT)
Dept: RADIATION ONCOLOGY | Facility: CLINIC | Age: 74
Setting detail: RADIATION/ONCOLOGY SERIES
Discharge: HOME | End: 2024-08-19
Payer: COMMERCIAL

## 2024-08-19 ENCOUNTER — LAB (OUTPATIENT)
Dept: LAB | Facility: CLINIC | Age: 74
End: 2024-08-19
Payer: COMMERCIAL

## 2024-08-19 ENCOUNTER — OFFICE VISIT (OUTPATIENT)
Dept: HEMATOLOGY/ONCOLOGY | Facility: CLINIC | Age: 74
End: 2024-08-19
Payer: COMMERCIAL

## 2024-08-19 VITALS
WEIGHT: 222.55 LBS | RESPIRATION RATE: 19 BRPM | SYSTOLIC BLOOD PRESSURE: 156 MMHG | OXYGEN SATURATION: 97 % | HEART RATE: 51 BPM | TEMPERATURE: 95.9 F | DIASTOLIC BLOOD PRESSURE: 96 MMHG | BODY MASS INDEX: 32.87 KG/M2

## 2024-08-19 DIAGNOSIS — C61 PROSTATE CANCER (MULTI): ICD-10-CM

## 2024-08-19 DIAGNOSIS — C61 MALIGNANT NEOPLASM OF PROSTATE (MULTI): ICD-10-CM

## 2024-08-19 DIAGNOSIS — Z51.0 ENCOUNTER FOR ANTINEOPLASTIC RADIATION THERAPY: ICD-10-CM

## 2024-08-19 LAB
ALBUMIN SERPL BCP-MCNC: 4.3 G/DL (ref 3.4–5)
ALP SERPL-CCNC: 74 U/L (ref 33–136)
ALT SERPL W P-5'-P-CCNC: 144 U/L (ref 10–52)
ANION GAP SERPL CALC-SCNC: 13 MMOL/L (ref 10–20)
AST SERPL W P-5'-P-CCNC: 74 U/L (ref 9–39)
BASOPHILS # BLD AUTO: 0.05 X10*3/UL (ref 0–0.1)
BASOPHILS NFR BLD AUTO: 0.8 %
BILIRUB SERPL-MCNC: 1.1 MG/DL (ref 0–1.2)
BUN SERPL-MCNC: 15 MG/DL (ref 6–23)
CALCIUM SERPL-MCNC: 9.4 MG/DL (ref 8.6–10.3)
CHLORIDE SERPL-SCNC: 105 MMOL/L (ref 98–107)
CO2 SERPL-SCNC: 26 MMOL/L (ref 21–32)
CREAT SERPL-MCNC: 1.07 MG/DL (ref 0.5–1.3)
EGFRCR SERPLBLD CKD-EPI 2021: 74 ML/MIN/1.73M*2
EOSINOPHIL # BLD AUTO: 0.25 X10*3/UL (ref 0–0.4)
EOSINOPHIL NFR BLD AUTO: 4.2 %
ERYTHROCYTE [DISTWIDTH] IN BLOOD BY AUTOMATED COUNT: 15.6 % (ref 11.5–14.5)
GLUCOSE SERPL-MCNC: 70 MG/DL (ref 74–99)
HCT VFR BLD AUTO: 42.4 % (ref 41–52)
HGB BLD-MCNC: 13.3 G/DL (ref 13.5–17.5)
IMM GRANULOCYTES # BLD AUTO: 0.02 X10*3/UL (ref 0–0.5)
IMM GRANULOCYTES NFR BLD AUTO: 0.3 % (ref 0–0.9)
LYMPHOCYTES # BLD AUTO: 1.77 X10*3/UL (ref 0.8–3)
LYMPHOCYTES NFR BLD AUTO: 30 %
MCH RBC QN AUTO: 27.4 PG (ref 26–34)
MCHC RBC AUTO-ENTMCNC: 31.4 G/DL (ref 32–36)
MCV RBC AUTO: 87 FL (ref 80–100)
MONOCYTES # BLD AUTO: 0.43 X10*3/UL (ref 0.05–0.8)
MONOCYTES NFR BLD AUTO: 7.3 %
NEUTROPHILS # BLD AUTO: 3.38 X10*3/UL (ref 1.6–5.5)
NEUTROPHILS NFR BLD AUTO: 57.4 %
NRBC BLD-RTO: 0 /100 WBCS (ref 0–0)
PLATELET # BLD AUTO: 179 X10*3/UL (ref 150–450)
POTASSIUM SERPL-SCNC: 3.8 MMOL/L (ref 3.5–5.3)
PROT SERPL-MCNC: 7.7 G/DL (ref 6.4–8.2)
PSA SERPL-MCNC: 13.86 NG/ML
RAD ONC MSQ ACTUAL FRACTIONS DELIVERED: 1
RAD ONC MSQ ACTUAL SESSION DELIVERED DOSE: 300 CGRAY
RAD ONC MSQ ACTUAL TOTAL DOSE: 300 CGRAY
RAD ONC MSQ ELAPSED DAYS: 0
RAD ONC MSQ LAST DATE: NORMAL
RAD ONC MSQ PRESCRIBED FRACTIONAL DOSE: 300 CGRAY
RAD ONC MSQ PRESCRIBED NUMBER OF FRACTIONS: 20
RAD ONC MSQ PRESCRIBED TECHNIQUE: NORMAL
RAD ONC MSQ PRESCRIBED TOTAL DOSE: 6000 CGRAY
RAD ONC MSQ PRESCRIPTION PATTERN COMMENT: NORMAL
RAD ONC MSQ START DATE: NORMAL
RAD ONC MSQ TREATMENT COURSE NUMBER: 1
RAD ONC MSQ TREATMENT SITE: NORMAL
RBC # BLD AUTO: 4.86 X10*6/UL (ref 4.5–5.9)
SODIUM SERPL-SCNC: 140 MMOL/L (ref 136–145)
TESTOST SERPL-MCNC: 35 NG/DL (ref 240–1000)
WBC # BLD AUTO: 5.9 X10*3/UL (ref 4.4–11.3)

## 2024-08-19 PROCEDURE — 84403 ASSAY OF TOTAL TESTOSTERONE: CPT | Mod: WESLAB

## 2024-08-19 PROCEDURE — 99214 OFFICE O/P EST MOD 30 MIN: CPT | Performed by: INTERNAL MEDICINE

## 2024-08-19 PROCEDURE — 77385 HC INTENSITY-MODULATED RADIATION THERAPY (IMRT), SIMPLE: CPT | Performed by: RADIOLOGY

## 2024-08-19 PROCEDURE — 84153 ASSAY OF PSA TOTAL: CPT | Mod: WESLAB

## 2024-08-19 PROCEDURE — 36415 COLL VENOUS BLD VENIPUNCTURE: CPT

## 2024-08-19 PROCEDURE — 80053 COMPREHEN METABOLIC PANEL: CPT

## 2024-08-19 PROCEDURE — 1126F AMNT PAIN NOTED NONE PRSNT: CPT | Performed by: INTERNAL MEDICINE

## 2024-08-19 PROCEDURE — 85025 COMPLETE CBC W/AUTO DIFF WBC: CPT

## 2024-08-19 ASSESSMENT — PAIN SCALES - GENERAL: PAINLEVEL: 0-NO PAIN

## 2024-08-19 NOTE — PROGRESS NOTES
Patient here for follow up visit.      No concerns or complaints noted at this time.   Patient here with guards per protocol.    Medications and Allergies reviewed and reconciled this visit.    Follow up per MD request.    Patient reports NO availability and use of mychart, Reviewed this is a good place to communicate with the team as well as review labs and upcoming orders.  AVM given to the guards for appointment verification.    No barriers to education noted, patient agrees to current plan and verbalized understanding using teach back method.

## 2024-08-20 ENCOUNTER — DOCUMENTATION (OUTPATIENT)
Dept: HEMATOLOGY/ONCOLOGY | Facility: CLINIC | Age: 74
End: 2024-08-20
Payer: COMMERCIAL

## 2024-08-20 ENCOUNTER — HOSPITAL ENCOUNTER (OUTPATIENT)
Dept: RADIATION ONCOLOGY | Facility: CLINIC | Age: 74
Setting detail: RADIATION/ONCOLOGY SERIES
Discharge: HOME | End: 2024-08-20
Payer: COMMERCIAL

## 2024-08-20 DIAGNOSIS — Z51.0 ENCOUNTER FOR ANTINEOPLASTIC RADIATION THERAPY: ICD-10-CM

## 2024-08-20 DIAGNOSIS — C61 MALIGNANT NEOPLASM OF PROSTATE (MULTI): ICD-10-CM

## 2024-08-20 LAB
RAD ONC MSQ ACTUAL FRACTIONS DELIVERED: 2
RAD ONC MSQ ACTUAL SESSION DELIVERED DOSE: 300 CGRAY
RAD ONC MSQ ACTUAL TOTAL DOSE: 600 CGRAY
RAD ONC MSQ ELAPSED DAYS: 1
RAD ONC MSQ LAST DATE: NORMAL
RAD ONC MSQ PRESCRIBED FRACTIONAL DOSE: 300 CGRAY
RAD ONC MSQ PRESCRIBED NUMBER OF FRACTIONS: 20
RAD ONC MSQ PRESCRIBED TECHNIQUE: NORMAL
RAD ONC MSQ PRESCRIBED TOTAL DOSE: 6000 CGRAY
RAD ONC MSQ PRESCRIPTION PATTERN COMMENT: NORMAL
RAD ONC MSQ START DATE: NORMAL
RAD ONC MSQ TREATMENT COURSE NUMBER: 1
RAD ONC MSQ TREATMENT SITE: NORMAL

## 2024-08-20 PROCEDURE — 77336 RADIATION PHYSICS CONSULT: CPT | Performed by: RADIOLOGY

## 2024-08-20 PROCEDURE — 77385 HC INTENSITY-MODULATED RADIATION THERAPY (IMRT), SIMPLE: CPT | Performed by: RADIOLOGY

## 2024-08-21 ENCOUNTER — HOSPITAL ENCOUNTER (OUTPATIENT)
Dept: RADIATION ONCOLOGY | Facility: CLINIC | Age: 74
Setting detail: RADIATION/ONCOLOGY SERIES
Discharge: HOME | End: 2024-08-21
Payer: COMMERCIAL

## 2024-08-21 DIAGNOSIS — Z51.0 ENCOUNTER FOR ANTINEOPLASTIC RADIATION THERAPY: ICD-10-CM

## 2024-08-21 DIAGNOSIS — C61 MALIGNANT NEOPLASM OF PROSTATE (MULTI): ICD-10-CM

## 2024-08-21 LAB
RAD ONC MSQ ACTUAL FRACTIONS DELIVERED: 3
RAD ONC MSQ ACTUAL SESSION DELIVERED DOSE: 300 CGRAY
RAD ONC MSQ ACTUAL TOTAL DOSE: 900 CGRAY
RAD ONC MSQ ELAPSED DAYS: 2
RAD ONC MSQ LAST DATE: NORMAL
RAD ONC MSQ PRESCRIBED FRACTIONAL DOSE: 300 CGRAY
RAD ONC MSQ PRESCRIBED NUMBER OF FRACTIONS: 20
RAD ONC MSQ PRESCRIBED TECHNIQUE: NORMAL
RAD ONC MSQ PRESCRIBED TOTAL DOSE: 6000 CGRAY
RAD ONC MSQ PRESCRIPTION PATTERN COMMENT: NORMAL
RAD ONC MSQ START DATE: NORMAL
RAD ONC MSQ TREATMENT COURSE NUMBER: 1
RAD ONC MSQ TREATMENT SITE: NORMAL

## 2024-08-21 PROCEDURE — 77385 HC INTENSITY-MODULATED RADIATION THERAPY (IMRT), SIMPLE: CPT | Performed by: RADIOLOGY

## 2024-08-22 ENCOUNTER — HOSPITAL ENCOUNTER (OUTPATIENT)
Dept: RADIATION ONCOLOGY | Facility: CLINIC | Age: 74
Setting detail: RADIATION/ONCOLOGY SERIES
Discharge: HOME | End: 2024-08-22
Payer: COMMERCIAL

## 2024-08-22 DIAGNOSIS — Z51.0 ENCOUNTER FOR ANTINEOPLASTIC RADIATION THERAPY: ICD-10-CM

## 2024-08-22 DIAGNOSIS — C61 MALIGNANT NEOPLASM OF PROSTATE (MULTI): ICD-10-CM

## 2024-08-22 LAB
RAD ONC MSQ ACTUAL FRACTIONS DELIVERED: 4
RAD ONC MSQ ACTUAL SESSION DELIVERED DOSE: 300 CGRAY
RAD ONC MSQ ACTUAL TOTAL DOSE: 1200 CGRAY
RAD ONC MSQ ELAPSED DAYS: 3
RAD ONC MSQ LAST DATE: NORMAL
RAD ONC MSQ PRESCRIBED FRACTIONAL DOSE: 300 CGRAY
RAD ONC MSQ PRESCRIBED NUMBER OF FRACTIONS: 20
RAD ONC MSQ PRESCRIBED TECHNIQUE: NORMAL
RAD ONC MSQ PRESCRIBED TOTAL DOSE: 6000 CGRAY
RAD ONC MSQ PRESCRIPTION PATTERN COMMENT: NORMAL
RAD ONC MSQ START DATE: NORMAL
RAD ONC MSQ TREATMENT COURSE NUMBER: 1
RAD ONC MSQ TREATMENT SITE: NORMAL

## 2024-08-22 PROCEDURE — 77385 HC INTENSITY-MODULATED RADIATION THERAPY (IMRT), SIMPLE: CPT | Performed by: RADIOLOGY

## 2024-08-23 ENCOUNTER — RADIATION ONCOLOGY OTV (OUTPATIENT)
Dept: RADIATION ONCOLOGY | Facility: CLINIC | Age: 74
End: 2024-08-23
Payer: COMMERCIAL

## 2024-08-23 ENCOUNTER — HOSPITAL ENCOUNTER (OUTPATIENT)
Dept: RADIATION ONCOLOGY | Facility: CLINIC | Age: 74
Setting detail: RADIATION/ONCOLOGY SERIES
Discharge: HOME | End: 2024-08-23
Payer: COMMERCIAL

## 2024-08-23 VITALS
WEIGHT: 220.9 LBS | SYSTOLIC BLOOD PRESSURE: 141 MMHG | DIASTOLIC BLOOD PRESSURE: 80 MMHG | OXYGEN SATURATION: 98 % | BODY MASS INDEX: 32.62 KG/M2 | HEART RATE: 56 BPM | TEMPERATURE: 96.8 F | RESPIRATION RATE: 16 BRPM

## 2024-08-23 DIAGNOSIS — Z51.0 ENCOUNTER FOR ANTINEOPLASTIC RADIATION THERAPY: ICD-10-CM

## 2024-08-23 DIAGNOSIS — C61 MALIGNANT NEOPLASM OF PROSTATE (MULTI): ICD-10-CM

## 2024-08-23 LAB
RAD ONC MSQ ACTUAL FRACTIONS DELIVERED: 5
RAD ONC MSQ ACTUAL SESSION DELIVERED DOSE: 300 CGRAY
RAD ONC MSQ ACTUAL TOTAL DOSE: 1500 CGRAY
RAD ONC MSQ ELAPSED DAYS: 4
RAD ONC MSQ LAST DATE: NORMAL
RAD ONC MSQ PRESCRIBED FRACTIONAL DOSE: 300 CGRAY
RAD ONC MSQ PRESCRIBED NUMBER OF FRACTIONS: 20
RAD ONC MSQ PRESCRIBED TECHNIQUE: NORMAL
RAD ONC MSQ PRESCRIBED TOTAL DOSE: 6000 CGRAY
RAD ONC MSQ PRESCRIPTION PATTERN COMMENT: NORMAL
RAD ONC MSQ START DATE: NORMAL
RAD ONC MSQ TREATMENT COURSE NUMBER: 1
RAD ONC MSQ TREATMENT SITE: NORMAL

## 2024-08-23 PROCEDURE — 77385 HC INTENSITY-MODULATED RADIATION THERAPY (IMRT), SIMPLE: CPT | Performed by: RADIOLOGY

## 2024-08-23 SDOH — ECONOMIC STABILITY: FOOD INSECURITY: WITHIN THE PAST 12 MONTHS, THE FOOD YOU BOUGHT JUST DIDN'T LAST AND YOU DIDN'T HAVE MONEY TO GET MORE.: NEVER TRUE

## 2024-08-23 SDOH — ECONOMIC STABILITY: FOOD INSECURITY: WITHIN THE PAST 12 MONTHS, YOU WORRIED THAT YOUR FOOD WOULD RUN OUT BEFORE YOU GOT MONEY TO BUY MORE.: NEVER TRUE

## 2024-08-23 SDOH — ECONOMIC STABILITY: INCOME INSECURITY: IN THE LAST 12 MONTHS, WAS THERE A TIME WHEN YOU WERE NOT ABLE TO PAY THE MORTGAGE OR RENT ON TIME?: NO

## 2024-08-23 ASSESSMENT — ENCOUNTER SYMPTOMS
OCCASIONAL FEELINGS OF UNSTEADINESS: 0
DEPRESSION: 0
LOSS OF SENSATION IN FEET: 0

## 2024-08-23 ASSESSMENT — LIFESTYLE VARIABLES
SKIP TO QUESTIONS 9-10: 1
AUDIT-C TOTAL SCORE: 0
HOW OFTEN DO YOU HAVE A DRINK CONTAINING ALCOHOL: NEVER
HOW MANY STANDARD DRINKS CONTAINING ALCOHOL DO YOU HAVE ON A TYPICAL DAY: PATIENT DOES NOT DRINK
HOW OFTEN DO YOU HAVE SIX OR MORE DRINKS ON ONE OCCASION: NEVER

## 2024-08-23 ASSESSMENT — PATIENT HEALTH QUESTIONNAIRE - PHQ9
2. FEELING DOWN, DEPRESSED OR HOPELESS: NOT AT ALL
SUM OF ALL RESPONSES TO PHQ9 QUESTIONS 1 AND 2: 0
1. LITTLE INTEREST OR PLEASURE IN DOING THINGS: NOT AT ALL

## 2024-08-23 ASSESSMENT — PAIN SCALES - GENERAL: PAINLEVEL: 0-NO PAIN

## 2024-08-23 NOTE — PROGRESS NOTES
Radiation Oncology On Treatment Visit    Patient Name:  Binu Winters  MRN:  [id number]  :  1950    Referring Provider: No ref. provider found  Primary Care Provider: No Assigned PCP Generic Provider, MD  Care Team: Patient Care Team:  No Assigned Pcp Generic Provider, MD as PCP - General (General Practice)  James Stahl MD as Consulting Physician (Hematology and Oncology)    Date of Service: 2024     Diagnosis:   Specialty Problems          Radiation Oncology Problems    Prostate cancer (Multi)         Treatment Summary:  Other Treatments    Treatment Period Technique Fraction Dose Fractions Total Dose   Course 1 2024-2024  (days elapsed: 4)         Prost_SVs 2024-2024 VMAT 300 / 300 cGy  1500 / 6,000 cGy     SUBJECTIVE: The patient feels well and has no issues.         OBJECTIVE:   Vital Signs:  /80 (BP Location: Left arm, Patient Position: Sitting, BP Cuff Size: Adult long)   Pulse 56   Temp 36 °C (96.8 °F) (Temporal)   Resp 16   Wt 100 kg (220 lb 14.4 oz)   SpO2 98%   BMI 32.62 kg/m²    Pain Scale: The patient's current pain level was assessed.  They report currently having a pain of 0 out of 10.    Other Pertinent Findings:     Toxicity Assessment          2024    10:10   Toxicity Assessment   Treatment Site Pelvis - male   Anorexia Grade 0   Anxiety Grade 0   Dehydration Grade 0   Depression Grade 0   Dermatitis Radiation Grade 0   Diarrhea Grade 0   Fatigue Grade 0   Fibrosis Deep Connective Tissue Grade 0   Fracture Grade 0   Nausea Grade 0   Pain Grade 0   Treatment Related Secondary Malignancy Grade 0   Tumor Pain Grade 0   Vomiting Grade 0   Abdominal Pain Grade 0   Bloating Grade 0   Constipation Grade 0   Dysphagia Grade 0   Enterovesical Fistula Grade 0   Fecal Incontinence Grade 0   Lower Gastrointestinal Hemorrhage Grade 0   Mucositis Oral Grade 0   Proctitis Grade 0   Rectal Hemorrhage Grade 0   Rectal Pain Grade 0   Rectal Ulcer Grade 0    Small Intestinal Obstruction Grade 0   Cystitis Noninfective Grade 0   Hematuria Grade 0   Urinary Incontinence Grade 0   Erectile Dysfunction Grade 0   Dry Mouth Grade 0   Edema Limbs Grade 0   Gastric Fistula Grade 0   Rectal Mucositis Grade 0   Rectal Stenosis Grade 0   Bladder Spasm Grade 0   Urinary Frequency Grade 0   Urinary Retention Grade 0   Urinary Tract Obstruction Grade 0   Urinary Tract Pain Grade 0   Urinary Urgency Grade 0   Ejaculation Disorder Grade 0        Assessment / Plan:  The patient is tolerating radiation therapy as anticipated.  Continue per current treatment plan.

## 2024-08-26 ENCOUNTER — HOSPITAL ENCOUNTER (OUTPATIENT)
Dept: RADIATION ONCOLOGY | Facility: CLINIC | Age: 74
Setting detail: RADIATION/ONCOLOGY SERIES
Discharge: HOME | End: 2024-08-26
Payer: COMMERCIAL

## 2024-08-26 DIAGNOSIS — C61 MALIGNANT NEOPLASM OF PROSTATE (MULTI): ICD-10-CM

## 2024-08-26 DIAGNOSIS — Z51.0 ENCOUNTER FOR ANTINEOPLASTIC RADIATION THERAPY: ICD-10-CM

## 2024-08-26 LAB
RAD ONC MSQ ACTUAL FRACTIONS DELIVERED: 6
RAD ONC MSQ ACTUAL SESSION DELIVERED DOSE: 300 CGRAY
RAD ONC MSQ ACTUAL TOTAL DOSE: 1800 CGRAY
RAD ONC MSQ ELAPSED DAYS: 7
RAD ONC MSQ LAST DATE: NORMAL
RAD ONC MSQ PRESCRIBED FRACTIONAL DOSE: 300 CGRAY
RAD ONC MSQ PRESCRIBED NUMBER OF FRACTIONS: 20
RAD ONC MSQ PRESCRIBED TECHNIQUE: NORMAL
RAD ONC MSQ PRESCRIBED TOTAL DOSE: 6000 CGRAY
RAD ONC MSQ PRESCRIPTION PATTERN COMMENT: NORMAL
RAD ONC MSQ START DATE: NORMAL
RAD ONC MSQ TREATMENT COURSE NUMBER: 1
RAD ONC MSQ TREATMENT SITE: NORMAL

## 2024-08-26 PROCEDURE — 77385 HC INTENSITY-MODULATED RADIATION THERAPY (IMRT), SIMPLE: CPT | Performed by: RADIOLOGY

## 2024-08-27 ENCOUNTER — HOSPITAL ENCOUNTER (OUTPATIENT)
Dept: RADIATION ONCOLOGY | Facility: CLINIC | Age: 74
Setting detail: RADIATION/ONCOLOGY SERIES
Discharge: HOME | End: 2024-08-27
Payer: COMMERCIAL

## 2024-08-27 DIAGNOSIS — C61 MALIGNANT NEOPLASM OF PROSTATE (MULTI): ICD-10-CM

## 2024-08-27 DIAGNOSIS — Z51.0 ENCOUNTER FOR ANTINEOPLASTIC RADIATION THERAPY: ICD-10-CM

## 2024-08-27 LAB
RAD ONC MSQ ACTUAL FRACTIONS DELIVERED: 7
RAD ONC MSQ ACTUAL SESSION DELIVERED DOSE: 300 CGRAY
RAD ONC MSQ ACTUAL TOTAL DOSE: 2100 CGRAY
RAD ONC MSQ ELAPSED DAYS: 8
RAD ONC MSQ LAST DATE: NORMAL
RAD ONC MSQ PRESCRIBED FRACTIONAL DOSE: 300 CGRAY
RAD ONC MSQ PRESCRIBED NUMBER OF FRACTIONS: 20
RAD ONC MSQ PRESCRIBED TECHNIQUE: NORMAL
RAD ONC MSQ PRESCRIBED TOTAL DOSE: 6000 CGRAY
RAD ONC MSQ PRESCRIPTION PATTERN COMMENT: NORMAL
RAD ONC MSQ START DATE: NORMAL
RAD ONC MSQ TREATMENT COURSE NUMBER: 1
RAD ONC MSQ TREATMENT SITE: NORMAL

## 2024-08-27 PROCEDURE — 77385 HC INTENSITY-MODULATED RADIATION THERAPY (IMRT), SIMPLE: CPT | Performed by: RADIOLOGY

## 2024-08-27 PROCEDURE — 77336 RADIATION PHYSICS CONSULT: CPT | Performed by: RADIOLOGY

## 2024-08-28 ENCOUNTER — HOSPITAL ENCOUNTER (OUTPATIENT)
Dept: RADIATION ONCOLOGY | Facility: CLINIC | Age: 74
Setting detail: RADIATION/ONCOLOGY SERIES
Discharge: HOME | End: 2024-08-28
Payer: COMMERCIAL

## 2024-08-28 DIAGNOSIS — C61 MALIGNANT NEOPLASM OF PROSTATE (MULTI): ICD-10-CM

## 2024-08-28 DIAGNOSIS — Z51.0 ENCOUNTER FOR ANTINEOPLASTIC RADIATION THERAPY: ICD-10-CM

## 2024-08-28 LAB
RAD ONC MSQ ACTUAL FRACTIONS DELIVERED: 8
RAD ONC MSQ ACTUAL SESSION DELIVERED DOSE: 300 CGRAY
RAD ONC MSQ ACTUAL TOTAL DOSE: 2400 CGRAY
RAD ONC MSQ ELAPSED DAYS: 9
RAD ONC MSQ LAST DATE: NORMAL
RAD ONC MSQ PRESCRIBED FRACTIONAL DOSE: 300 CGRAY
RAD ONC MSQ PRESCRIBED NUMBER OF FRACTIONS: 20
RAD ONC MSQ PRESCRIBED TECHNIQUE: NORMAL
RAD ONC MSQ PRESCRIBED TOTAL DOSE: 6000 CGRAY
RAD ONC MSQ PRESCRIPTION PATTERN COMMENT: NORMAL
RAD ONC MSQ START DATE: NORMAL
RAD ONC MSQ TREATMENT COURSE NUMBER: 1
RAD ONC MSQ TREATMENT SITE: NORMAL

## 2024-08-28 PROCEDURE — 77385 HC INTENSITY-MODULATED RADIATION THERAPY (IMRT), SIMPLE: CPT | Performed by: RADIOLOGY

## 2024-08-29 ENCOUNTER — HOSPITAL ENCOUNTER (OUTPATIENT)
Dept: RADIATION ONCOLOGY | Facility: CLINIC | Age: 74
Setting detail: RADIATION/ONCOLOGY SERIES
Discharge: HOME | End: 2024-08-29
Payer: COMMERCIAL

## 2024-08-29 DIAGNOSIS — C61 MALIGNANT NEOPLASM OF PROSTATE (MULTI): ICD-10-CM

## 2024-08-29 DIAGNOSIS — Z51.0 ENCOUNTER FOR ANTINEOPLASTIC RADIATION THERAPY: ICD-10-CM

## 2024-08-29 LAB
RAD ONC MSQ ACTUAL FRACTIONS DELIVERED: 9
RAD ONC MSQ ACTUAL SESSION DELIVERED DOSE: 300 CGRAY
RAD ONC MSQ ACTUAL TOTAL DOSE: 2700 CGRAY
RAD ONC MSQ ELAPSED DAYS: 10
RAD ONC MSQ LAST DATE: NORMAL
RAD ONC MSQ PRESCRIBED FRACTIONAL DOSE: 300 CGRAY
RAD ONC MSQ PRESCRIBED NUMBER OF FRACTIONS: 20
RAD ONC MSQ PRESCRIBED TECHNIQUE: NORMAL
RAD ONC MSQ PRESCRIBED TOTAL DOSE: 6000 CGRAY
RAD ONC MSQ PRESCRIPTION PATTERN COMMENT: NORMAL
RAD ONC MSQ START DATE: NORMAL
RAD ONC MSQ TREATMENT COURSE NUMBER: 1
RAD ONC MSQ TREATMENT SITE: NORMAL

## 2024-08-29 PROCEDURE — 77385 HC INTENSITY-MODULATED RADIATION THERAPY (IMRT), SIMPLE: CPT | Performed by: RADIOLOGY

## 2024-08-30 ENCOUNTER — RADIATION ONCOLOGY OTV (OUTPATIENT)
Dept: RADIATION ONCOLOGY | Facility: CLINIC | Age: 74
End: 2024-08-30
Payer: COMMERCIAL

## 2024-08-30 ENCOUNTER — HOSPITAL ENCOUNTER (OUTPATIENT)
Dept: RADIATION ONCOLOGY | Facility: CLINIC | Age: 74
Setting detail: RADIATION/ONCOLOGY SERIES
Discharge: HOME | End: 2024-08-30
Payer: COMMERCIAL

## 2024-08-30 VITALS
WEIGHT: 220.02 LBS | SYSTOLIC BLOOD PRESSURE: 157 MMHG | TEMPERATURE: 96.1 F | RESPIRATION RATE: 18 BRPM | BODY MASS INDEX: 32.49 KG/M2 | HEART RATE: 56 BPM | DIASTOLIC BLOOD PRESSURE: 83 MMHG | OXYGEN SATURATION: 97 %

## 2024-08-30 DIAGNOSIS — C61 MALIGNANT NEOPLASM OF PROSTATE (MULTI): ICD-10-CM

## 2024-08-30 DIAGNOSIS — Z51.0 ENCOUNTER FOR ANTINEOPLASTIC RADIATION THERAPY: ICD-10-CM

## 2024-08-30 LAB
RAD ONC MSQ ACTUAL FRACTIONS DELIVERED: 10
RAD ONC MSQ ACTUAL SESSION DELIVERED DOSE: 300 CGRAY
RAD ONC MSQ ACTUAL TOTAL DOSE: 3000 CGRAY
RAD ONC MSQ ELAPSED DAYS: 11
RAD ONC MSQ LAST DATE: NORMAL
RAD ONC MSQ PRESCRIBED FRACTIONAL DOSE: 300 CGRAY
RAD ONC MSQ PRESCRIBED NUMBER OF FRACTIONS: 20
RAD ONC MSQ PRESCRIBED TECHNIQUE: NORMAL
RAD ONC MSQ PRESCRIBED TOTAL DOSE: 6000 CGRAY
RAD ONC MSQ PRESCRIPTION PATTERN COMMENT: NORMAL
RAD ONC MSQ START DATE: NORMAL
RAD ONC MSQ TREATMENT COURSE NUMBER: 1
RAD ONC MSQ TREATMENT SITE: NORMAL

## 2024-08-30 PROCEDURE — 77385 HC INTENSITY-MODULATED RADIATION THERAPY (IMRT), SIMPLE: CPT | Performed by: RADIOLOGY

## 2024-08-30 ASSESSMENT — ENCOUNTER SYMPTOMS
OCCASIONAL FEELINGS OF UNSTEADINESS: 0
LOSS OF SENSATION IN FEET: 0

## 2024-08-30 ASSESSMENT — PAIN SCALES - GENERAL: PAINLEVEL: 0-NO PAIN

## 2024-08-30 ASSESSMENT — COLUMBIA-SUICIDE SEVERITY RATING SCALE - C-SSRS
2. HAVE YOU ACTUALLY HAD ANY THOUGHTS OF KILLING YOURSELF?: NO
1. IN THE PAST MONTH, HAVE YOU WISHED YOU WERE DEAD OR WISHED YOU COULD GO TO SLEEP AND NOT WAKE UP?: NO

## 2024-08-30 ASSESSMENT — PATIENT HEALTH QUESTIONNAIRE - PHQ9
SUM OF ALL RESPONSES TO PHQ9 QUESTIONS 1 AND 2: 0
1. LITTLE INTEREST OR PLEASURE IN DOING THINGS: NOT AT ALL
2. FEELING DOWN, DEPRESSED OR HOPELESS: NOT AT ALL

## 2024-08-30 NOTE — PROGRESS NOTES
Radiation Oncology On Treatment Visit    Patient Name:  Binu Winters  MRN:  [id number]  :  1950    Referring Provider: No ref. provider found  Primary Care Provider: No Assigned PCP Generic Provider, MD  Care Team: Patient Care Team:  No Assigned Pcp Generic Provider, MD as PCP - General (General Practice)  James Stahl MD as Consulting Physician (Hematology and Oncology)    Date of Service: 2024     Diagnosis:   Specialty Problems          Radiation Oncology Problems    Prostate cancer (Multi)         Treatment Summary:  Other Treatments    Treatment Period Technique Fraction Dose Fractions Total Dose   Course 1 2024-2024  (days elapsed: 11)         Prost_SVs 2024-2024 VMAT 300 / 300 cGy 10 / 20 3000 / 6,000 cGy     SUBJECTIVE: The patient feels well and has no issues.         OBJECTIVE:   Vital Signs:  /83 (BP Location: Right arm, Patient Position: Sitting, BP Cuff Size: Adult long)   Pulse 56   Temp 35.6 °C (96.1 °F) (Temporal)   Resp 18   Wt 99.8 kg (220 lb 0.3 oz)   SpO2 97%   BMI 32.49 kg/m²    Pain Scale: The patient's current pain level was assessed.  They report currently having a pain of 0 out of 10.    Other Pertinent Findings:     Toxicity Assessment          2024    10:10   Toxicity Assessment   Treatment Site Pelvis - male   Anorexia Grade 0   Anxiety Grade 0   Dehydration Grade 0   Depression Grade 0   Dermatitis Radiation Grade 0   Diarrhea Grade 0   Fatigue Grade 0   Fibrosis Deep Connective Tissue Grade 0   Fracture Grade 0   Nausea Grade 0   Pain Grade 0   Treatment Related Secondary Malignancy Grade 0   Tumor Pain Grade 0   Vomiting Grade 0   Abdominal Pain Grade 0   Bloating Grade 0   Constipation Grade 0   Dysphagia Grade 0   Enterovesical Fistula Grade 0   Fecal Incontinence Grade 0   Lower Gastrointestinal Hemorrhage Grade 0   Mucositis Oral Grade 0   Proctitis Grade 0   Rectal Hemorrhage Grade 0   Rectal Pain Grade 0   Rectal Ulcer  Grade 0   Small Intestinal Obstruction Grade 0   Cystitis Noninfective Grade 0   Hematuria Grade 0   Urinary Incontinence Grade 0   Erectile Dysfunction Grade 0   Dry Mouth Grade 0   Edema Limbs Grade 0   Gastric Fistula Grade 0   Rectal Mucositis Grade 0   Rectal Stenosis Grade 0   Bladder Spasm Grade 0   Urinary Frequency Grade 0   Urinary Retention Grade 0   Urinary Tract Obstruction Grade 0   Urinary Tract Pain Grade 0   Urinary Urgency Grade 0   Ejaculation Disorder Grade 0        Assessment / Plan:  The patient is tolerating radiation therapy as anticipated.  Continue per current treatment plan.

## 2024-09-03 ENCOUNTER — HOSPITAL ENCOUNTER (OUTPATIENT)
Dept: RADIATION ONCOLOGY | Facility: CLINIC | Age: 74
Setting detail: RADIATION/ONCOLOGY SERIES
Discharge: HOME | End: 2024-09-03
Payer: COMMERCIAL

## 2024-09-03 DIAGNOSIS — Z51.0 ENCOUNTER FOR ANTINEOPLASTIC RADIATION THERAPY: ICD-10-CM

## 2024-09-03 DIAGNOSIS — C61 MALIGNANT NEOPLASM OF PROSTATE (MULTI): ICD-10-CM

## 2024-09-03 LAB
RAD ONC MSQ ACTUAL FRACTIONS DELIVERED: 11
RAD ONC MSQ ACTUAL SESSION DELIVERED DOSE: 300 CGRAY
RAD ONC MSQ ACTUAL TOTAL DOSE: 3300 CGRAY
RAD ONC MSQ ELAPSED DAYS: 15
RAD ONC MSQ LAST DATE: NORMAL
RAD ONC MSQ PRESCRIBED FRACTIONAL DOSE: 300 CGRAY
RAD ONC MSQ PRESCRIBED NUMBER OF FRACTIONS: 20
RAD ONC MSQ PRESCRIBED TECHNIQUE: NORMAL
RAD ONC MSQ PRESCRIBED TOTAL DOSE: 6000 CGRAY
RAD ONC MSQ PRESCRIPTION PATTERN COMMENT: NORMAL
RAD ONC MSQ START DATE: NORMAL
RAD ONC MSQ TREATMENT COURSE NUMBER: 1
RAD ONC MSQ TREATMENT SITE: NORMAL

## 2024-09-03 PROCEDURE — 77336 RADIATION PHYSICS CONSULT: CPT | Performed by: RADIOLOGY

## 2024-09-03 PROCEDURE — 77385 HC INTENSITY-MODULATED RADIATION THERAPY (IMRT), SIMPLE: CPT | Performed by: RADIOLOGY

## 2024-09-04 ENCOUNTER — HOSPITAL ENCOUNTER (OUTPATIENT)
Dept: RADIATION ONCOLOGY | Facility: CLINIC | Age: 74
Setting detail: RADIATION/ONCOLOGY SERIES
Discharge: HOME | End: 2024-09-04
Payer: COMMERCIAL

## 2024-09-04 DIAGNOSIS — C61 MALIGNANT NEOPLASM OF PROSTATE (MULTI): ICD-10-CM

## 2024-09-04 DIAGNOSIS — Z51.0 ENCOUNTER FOR ANTINEOPLASTIC RADIATION THERAPY: ICD-10-CM

## 2024-09-04 LAB
RAD ONC MSQ ACTUAL FRACTIONS DELIVERED: 12
RAD ONC MSQ ACTUAL SESSION DELIVERED DOSE: 300 CGRAY
RAD ONC MSQ ACTUAL TOTAL DOSE: 3600 CGRAY
RAD ONC MSQ ELAPSED DAYS: 16
RAD ONC MSQ LAST DATE: NORMAL
RAD ONC MSQ PRESCRIBED FRACTIONAL DOSE: 300 CGRAY
RAD ONC MSQ PRESCRIBED NUMBER OF FRACTIONS: 20
RAD ONC MSQ PRESCRIBED TECHNIQUE: NORMAL
RAD ONC MSQ PRESCRIBED TOTAL DOSE: 6000 CGRAY
RAD ONC MSQ PRESCRIPTION PATTERN COMMENT: NORMAL
RAD ONC MSQ START DATE: NORMAL
RAD ONC MSQ TREATMENT COURSE NUMBER: 1
RAD ONC MSQ TREATMENT SITE: NORMAL

## 2024-09-04 PROCEDURE — 77385 HC INTENSITY-MODULATED RADIATION THERAPY (IMRT), SIMPLE: CPT | Performed by: RADIOLOGY

## 2024-09-05 ENCOUNTER — HOSPITAL ENCOUNTER (OUTPATIENT)
Dept: RADIATION ONCOLOGY | Facility: CLINIC | Age: 74
Setting detail: RADIATION/ONCOLOGY SERIES
Discharge: HOME | End: 2024-09-05
Payer: COMMERCIAL

## 2024-09-05 DIAGNOSIS — Z51.0 ENCOUNTER FOR ANTINEOPLASTIC RADIATION THERAPY: ICD-10-CM

## 2024-09-05 DIAGNOSIS — C61 MALIGNANT NEOPLASM OF PROSTATE (MULTI): ICD-10-CM

## 2024-09-05 LAB
RAD ONC MSQ ACTUAL FRACTIONS DELIVERED: 13
RAD ONC MSQ ACTUAL SESSION DELIVERED DOSE: 300 CGRAY
RAD ONC MSQ ACTUAL TOTAL DOSE: 3900 CGRAY
RAD ONC MSQ ELAPSED DAYS: 17
RAD ONC MSQ LAST DATE: NORMAL
RAD ONC MSQ PRESCRIBED FRACTIONAL DOSE: 300 CGRAY
RAD ONC MSQ PRESCRIBED NUMBER OF FRACTIONS: 20
RAD ONC MSQ PRESCRIBED TECHNIQUE: NORMAL
RAD ONC MSQ PRESCRIBED TOTAL DOSE: 6000 CGRAY
RAD ONC MSQ PRESCRIPTION PATTERN COMMENT: NORMAL
RAD ONC MSQ START DATE: NORMAL
RAD ONC MSQ TREATMENT COURSE NUMBER: 1
RAD ONC MSQ TREATMENT SITE: NORMAL

## 2024-09-05 PROCEDURE — 77385 HC INTENSITY-MODULATED RADIATION THERAPY (IMRT), SIMPLE: CPT | Performed by: RADIOLOGY

## 2024-09-06 ENCOUNTER — RADIATION ONCOLOGY OTV (OUTPATIENT)
Dept: RADIATION ONCOLOGY | Facility: CLINIC | Age: 74
End: 2024-09-06
Payer: COMMERCIAL

## 2024-09-06 ENCOUNTER — HOSPITAL ENCOUNTER (OUTPATIENT)
Dept: RADIATION ONCOLOGY | Facility: CLINIC | Age: 74
Setting detail: RADIATION/ONCOLOGY SERIES
Discharge: HOME | End: 2024-09-06
Payer: COMMERCIAL

## 2024-09-06 VITALS
OXYGEN SATURATION: 97 % | TEMPERATURE: 97.9 F | SYSTOLIC BLOOD PRESSURE: 154 MMHG | WEIGHT: 219.58 LBS | BODY MASS INDEX: 32.43 KG/M2 | RESPIRATION RATE: 18 BRPM | DIASTOLIC BLOOD PRESSURE: 87 MMHG | HEART RATE: 61 BPM

## 2024-09-06 DIAGNOSIS — Z51.0 ENCOUNTER FOR ANTINEOPLASTIC RADIATION THERAPY: ICD-10-CM

## 2024-09-06 DIAGNOSIS — C61 MALIGNANT NEOPLASM OF PROSTATE (MULTI): ICD-10-CM

## 2024-09-06 LAB
RAD ONC MSQ ACTUAL FRACTIONS DELIVERED: 14
RAD ONC MSQ ACTUAL SESSION DELIVERED DOSE: 300 CGRAY
RAD ONC MSQ ACTUAL TOTAL DOSE: 4200 CGRAY
RAD ONC MSQ ELAPSED DAYS: 18
RAD ONC MSQ LAST DATE: NORMAL
RAD ONC MSQ PRESCRIBED FRACTIONAL DOSE: 300 CGRAY
RAD ONC MSQ PRESCRIBED NUMBER OF FRACTIONS: 20
RAD ONC MSQ PRESCRIBED TECHNIQUE: NORMAL
RAD ONC MSQ PRESCRIBED TOTAL DOSE: 6000 CGRAY
RAD ONC MSQ PRESCRIPTION PATTERN COMMENT: NORMAL
RAD ONC MSQ START DATE: NORMAL
RAD ONC MSQ TREATMENT COURSE NUMBER: 1
RAD ONC MSQ TREATMENT SITE: NORMAL

## 2024-09-06 PROCEDURE — 77385 HC INTENSITY-MODULATED RADIATION THERAPY (IMRT), SIMPLE: CPT | Performed by: RADIOLOGY

## 2024-09-06 ASSESSMENT — ENCOUNTER SYMPTOMS
LOSS OF SENSATION IN FEET: 0
OCCASIONAL FEELINGS OF UNSTEADINESS: 0
DEPRESSION: 0

## 2024-09-06 ASSESSMENT — COLUMBIA-SUICIDE SEVERITY RATING SCALE - C-SSRS
1. IN THE PAST MONTH, HAVE YOU WISHED YOU WERE DEAD OR WISHED YOU COULD GO TO SLEEP AND NOT WAKE UP?: NO
6. HAVE YOU EVER DONE ANYTHING, STARTED TO DO ANYTHING, OR PREPARED TO DO ANYTHING TO END YOUR LIFE?: NO
2. HAVE YOU ACTUALLY HAD ANY THOUGHTS OF KILLING YOURSELF?: NO

## 2024-09-06 ASSESSMENT — PAIN SCALES - GENERAL: PAINLEVEL: 0-NO PAIN

## 2024-09-06 NOTE — PROGRESS NOTES
Radiation Oncology On Treatment Visit    Patient Name:  Binu Winters  MRN:  [id number]  :  1950    Referring Provider: No ref. provider found  Primary Care Provider: No Assigned PCP Generic Provider, MD  Care Team: Patient Care Team:  No Assigned Pcp Generic Provider, MD as PCP - General (General Practice)  James Stahl MD as Consulting Physician (Hematology and Oncology)    Date of Service: 2024     Diagnosis:   Specialty Problems          Radiation Oncology Problems    Prostate cancer (Multi)         Treatment Summary:  Other Treatments    Treatment Period Technique Fraction Dose Fractions Total Dose   Course 1 2024-2024  (days elapsed: 18)         Prost_SVs 2024-2024 VMAT 300 / 300 cGy  4200 / 6,000 cGy     SUBJECTIVE: The patient feels well and has no issues.         OBJECTIVE:   Vital Signs:  /87 (BP Location: Left arm, Patient Position: Sitting, BP Cuff Size: Adult long)   Pulse 61   Temp 36.6 °C (97.9 °F) (Temporal)   Resp 18   Wt 99.6 kg (219 lb 9.3 oz)   SpO2 97%   BMI 32.43 kg/m²     Other Pertinent Findings:     Toxicity Assessment          2024    10:10 2024    14:00   Toxicity Assessment   Treatment Site Pelvis - male Pelvis - male   Anorexia Grade 0 Grade 0   Anxiety Grade 0 Grade 0   Dehydration Grade 0 Grade 0   Depression Grade 0 Grade 0   Dermatitis Radiation Grade 0 Grade 0   Diarrhea Grade 0 Grade 0   Fatigue Grade 0 Grade 0   Fibrosis Deep Connective Tissue Grade 0 Grade 0   Fracture Grade 0 Grade 0   Nausea Grade 0 Grade 0   Pain Grade 0 Grade 0   Treatment Related Secondary Malignancy Grade 0 Grade 0   Tumor Pain Grade 0 Grade 0   Vomiting Grade 0 Grade 0   Abdominal Pain Grade 0 Grade 0   Bloating Grade 0 Grade 0   Constipation Grade 0 Grade 0   Dysphagia Grade 0 Grade 0   Enterovesical Fistula Grade 0 Grade 0   Fecal Incontinence Grade 0 Grade 0   Lower Gastrointestinal Hemorrhage Grade 0 Grade 0   Mucositis Oral Grade 0 Grade  0   Proctitis Grade 0 Grade 0   Rectal Hemorrhage Grade 0 Grade 0   Rectal Pain Grade 0 Grade 0   Rectal Ulcer Grade 0 Grade 0   Small Intestinal Obstruction Grade 0 Grade 0   Cystitis Noninfective Grade 0 Grade 0   Hematuria Grade 0 Grade 0   Urinary Incontinence Grade 0 Grade 0   Erectile Dysfunction Grade 0    Dry Mouth Grade 0    Edema Limbs Grade 0    Gastric Fistula Grade 0 Grade 0   Rectal Mucositis Grade 0 Grade 0   Rectal Stenosis Grade 0 Grade 0   Bladder Spasm Grade 0 Grade 0   Urinary Frequency Grade 0 Grade 0   Urinary Retention Grade 0 Grade 0   Urinary Tract Obstruction Grade 0 Grade 0   Urinary Tract Pain Grade 0 Grade 0   Urinary Urgency Grade 0 Grade 0   Ejaculation Disorder Grade 0         Assessment / Plan:  The patient is tolerating radiation therapy as anticipated.  Continue per current treatment plan.

## 2024-09-09 ENCOUNTER — HOSPITAL ENCOUNTER (OUTPATIENT)
Dept: RADIATION ONCOLOGY | Facility: CLINIC | Age: 74
Setting detail: RADIATION/ONCOLOGY SERIES
Discharge: HOME | End: 2024-09-09
Payer: OTHER GOVERNMENT

## 2024-09-09 DIAGNOSIS — Z51.0 ENCOUNTER FOR ANTINEOPLASTIC RADIATION THERAPY: ICD-10-CM

## 2024-09-09 DIAGNOSIS — C61 MALIGNANT NEOPLASM OF PROSTATE (MULTI): ICD-10-CM

## 2024-09-09 LAB
RAD ONC MSQ ACTUAL FRACTIONS DELIVERED: 15
RAD ONC MSQ ACTUAL SESSION DELIVERED DOSE: 300 CGRAY
RAD ONC MSQ ACTUAL TOTAL DOSE: 4500 CGRAY
RAD ONC MSQ ELAPSED DAYS: 21
RAD ONC MSQ LAST DATE: NORMAL
RAD ONC MSQ PRESCRIBED FRACTIONAL DOSE: 300 CGRAY
RAD ONC MSQ PRESCRIBED NUMBER OF FRACTIONS: 20
RAD ONC MSQ PRESCRIBED TECHNIQUE: NORMAL
RAD ONC MSQ PRESCRIBED TOTAL DOSE: 6000 CGRAY
RAD ONC MSQ PRESCRIPTION PATTERN COMMENT: NORMAL
RAD ONC MSQ START DATE: NORMAL
RAD ONC MSQ TREATMENT COURSE NUMBER: 1
RAD ONC MSQ TREATMENT SITE: NORMAL

## 2024-09-09 PROCEDURE — 77385 HC INTENSITY-MODULATED RADIATION THERAPY (IMRT), SIMPLE: CPT | Performed by: RADIOLOGY

## 2024-09-10 ENCOUNTER — HOSPITAL ENCOUNTER (OUTPATIENT)
Dept: RADIATION ONCOLOGY | Facility: CLINIC | Age: 74
Setting detail: RADIATION/ONCOLOGY SERIES
Discharge: HOME | End: 2024-09-10
Payer: OTHER GOVERNMENT

## 2024-09-10 DIAGNOSIS — Z51.0 ENCOUNTER FOR ANTINEOPLASTIC RADIATION THERAPY: ICD-10-CM

## 2024-09-10 DIAGNOSIS — C61 MALIGNANT NEOPLASM OF PROSTATE (MULTI): ICD-10-CM

## 2024-09-10 LAB
RAD ONC MSQ ACTUAL FRACTIONS DELIVERED: 16
RAD ONC MSQ ACTUAL SESSION DELIVERED DOSE: 300 CGRAY
RAD ONC MSQ ACTUAL TOTAL DOSE: 4800 CGRAY
RAD ONC MSQ ELAPSED DAYS: 22
RAD ONC MSQ LAST DATE: NORMAL
RAD ONC MSQ PRESCRIBED FRACTIONAL DOSE: 300 CGRAY
RAD ONC MSQ PRESCRIBED NUMBER OF FRACTIONS: 20
RAD ONC MSQ PRESCRIBED TECHNIQUE: NORMAL
RAD ONC MSQ PRESCRIBED TOTAL DOSE: 6000 CGRAY
RAD ONC MSQ PRESCRIPTION PATTERN COMMENT: NORMAL
RAD ONC MSQ START DATE: NORMAL
RAD ONC MSQ TREATMENT COURSE NUMBER: 1
RAD ONC MSQ TREATMENT SITE: NORMAL

## 2024-09-10 PROCEDURE — 77385 HC INTENSITY-MODULATED RADIATION THERAPY (IMRT), SIMPLE: CPT | Performed by: RADIOLOGY

## 2024-09-10 PROCEDURE — 77336 RADIATION PHYSICS CONSULT: CPT | Performed by: RADIOLOGY

## 2024-09-11 ENCOUNTER — HOSPITAL ENCOUNTER (OUTPATIENT)
Dept: RADIATION ONCOLOGY | Facility: CLINIC | Age: 74
Setting detail: RADIATION/ONCOLOGY SERIES
Discharge: HOME | End: 2024-09-11
Payer: OTHER GOVERNMENT

## 2024-09-11 DIAGNOSIS — C61 MALIGNANT NEOPLASM OF PROSTATE (MULTI): ICD-10-CM

## 2024-09-11 DIAGNOSIS — Z51.0 ENCOUNTER FOR ANTINEOPLASTIC RADIATION THERAPY: ICD-10-CM

## 2024-09-11 LAB
RAD ONC MSQ ACTUAL FRACTIONS DELIVERED: 17
RAD ONC MSQ ACTUAL SESSION DELIVERED DOSE: 300 CGRAY
RAD ONC MSQ ACTUAL TOTAL DOSE: 5100 CGRAY
RAD ONC MSQ ELAPSED DAYS: 23
RAD ONC MSQ LAST DATE: NORMAL
RAD ONC MSQ PRESCRIBED FRACTIONAL DOSE: 300 CGRAY
RAD ONC MSQ PRESCRIBED NUMBER OF FRACTIONS: 20
RAD ONC MSQ PRESCRIBED TECHNIQUE: NORMAL
RAD ONC MSQ PRESCRIBED TOTAL DOSE: 6000 CGRAY
RAD ONC MSQ PRESCRIPTION PATTERN COMMENT: NORMAL
RAD ONC MSQ START DATE: NORMAL
RAD ONC MSQ TREATMENT COURSE NUMBER: 1
RAD ONC MSQ TREATMENT SITE: NORMAL

## 2024-09-11 PROCEDURE — 77385 HC INTENSITY-MODULATED RADIATION THERAPY (IMRT), SIMPLE: CPT | Performed by: RADIOLOGY

## 2024-09-12 ENCOUNTER — HOSPITAL ENCOUNTER (OUTPATIENT)
Dept: RADIATION ONCOLOGY | Facility: CLINIC | Age: 74
Setting detail: RADIATION/ONCOLOGY SERIES
Discharge: HOME | End: 2024-09-12
Payer: OTHER GOVERNMENT

## 2024-09-12 DIAGNOSIS — C61 MALIGNANT NEOPLASM OF PROSTATE (MULTI): ICD-10-CM

## 2024-09-12 DIAGNOSIS — Z51.0 ENCOUNTER FOR ANTINEOPLASTIC RADIATION THERAPY: ICD-10-CM

## 2024-09-12 LAB
RAD ONC MSQ ACTUAL FRACTIONS DELIVERED: 18
RAD ONC MSQ ACTUAL SESSION DELIVERED DOSE: 300 CGRAY
RAD ONC MSQ ACTUAL TOTAL DOSE: 5400 CGRAY
RAD ONC MSQ ELAPSED DAYS: 24
RAD ONC MSQ LAST DATE: NORMAL
RAD ONC MSQ PRESCRIBED FRACTIONAL DOSE: 300 CGRAY
RAD ONC MSQ PRESCRIBED NUMBER OF FRACTIONS: 20
RAD ONC MSQ PRESCRIBED TECHNIQUE: NORMAL
RAD ONC MSQ PRESCRIBED TOTAL DOSE: 6000 CGRAY
RAD ONC MSQ PRESCRIPTION PATTERN COMMENT: NORMAL
RAD ONC MSQ START DATE: NORMAL
RAD ONC MSQ TREATMENT COURSE NUMBER: 1
RAD ONC MSQ TREATMENT SITE: NORMAL

## 2024-09-12 PROCEDURE — 77385 HC INTENSITY-MODULATED RADIATION THERAPY (IMRT), SIMPLE: CPT | Performed by: RADIOLOGY

## 2024-09-13 ENCOUNTER — RADIATION ONCOLOGY OTV (OUTPATIENT)
Dept: RADIATION ONCOLOGY | Facility: CLINIC | Age: 74
End: 2024-09-13
Payer: OTHER GOVERNMENT

## 2024-09-13 ENCOUNTER — HOSPITAL ENCOUNTER (OUTPATIENT)
Dept: RADIATION ONCOLOGY | Facility: CLINIC | Age: 74
Setting detail: RADIATION/ONCOLOGY SERIES
Discharge: HOME | End: 2024-09-13
Payer: OTHER GOVERNMENT

## 2024-09-13 VITALS
BODY MASS INDEX: 32.52 KG/M2 | TEMPERATURE: 95.9 F | WEIGHT: 220.24 LBS | DIASTOLIC BLOOD PRESSURE: 78 MMHG | RESPIRATION RATE: 18 BRPM | HEART RATE: 53 BPM | SYSTOLIC BLOOD PRESSURE: 167 MMHG | OXYGEN SATURATION: 97 %

## 2024-09-13 DIAGNOSIS — C61 MALIGNANT NEOPLASM OF PROSTATE (MULTI): ICD-10-CM

## 2024-09-13 DIAGNOSIS — Z51.0 ENCOUNTER FOR ANTINEOPLASTIC RADIATION THERAPY: ICD-10-CM

## 2024-09-13 LAB
RAD ONC MSQ ACTUAL FRACTIONS DELIVERED: 19
RAD ONC MSQ ACTUAL SESSION DELIVERED DOSE: 300 CGRAY
RAD ONC MSQ ACTUAL TOTAL DOSE: 5700 CGRAY
RAD ONC MSQ ELAPSED DAYS: 25
RAD ONC MSQ LAST DATE: NORMAL
RAD ONC MSQ PRESCRIBED FRACTIONAL DOSE: 300 CGRAY
RAD ONC MSQ PRESCRIBED NUMBER OF FRACTIONS: 20
RAD ONC MSQ PRESCRIBED TECHNIQUE: NORMAL
RAD ONC MSQ PRESCRIBED TOTAL DOSE: 6000 CGRAY
RAD ONC MSQ PRESCRIPTION PATTERN COMMENT: NORMAL
RAD ONC MSQ START DATE: NORMAL
RAD ONC MSQ TREATMENT COURSE NUMBER: 1
RAD ONC MSQ TREATMENT SITE: NORMAL

## 2024-09-13 PROCEDURE — 77385 HC INTENSITY-MODULATED RADIATION THERAPY (IMRT), SIMPLE: CPT | Performed by: RADIOLOGY

## 2024-09-13 ASSESSMENT — PAIN SCALES - GENERAL: PAINLEVEL: 0-NO PAIN

## 2024-09-13 NOTE — PROGRESS NOTES
Radiation Oncology On Treatment Visit    Patient Name:  Binu Winters  MRN:  [id number]  :  1950    Referring Provider: No ref. provider found  Primary Care Provider: No Assigned PCP Generic Provider, MD  Care Team: Patient Care Team:  No Assigned Pcp Generic Provider, MD as PCP - General (General Practice)  James Stahl MD as Consulting Physician (Hematology and Oncology)    Date of Service: 2024     Diagnosis:   Specialty Problems          Radiation Oncology Problems    Prostate cancer (Multi)         Treatment Summary:  Other Treatments    Treatment Period Technique Fraction Dose Fractions Total Dose   Course 1 2024-2024  (days elapsed: 25)         Prost_SVs 2024-2024 VMAT 300 / 300 cGy  5700 / 6,000 cGy     SUBJECTIVE: The patient feels well and has no issues.         OBJECTIVE:   Vital Signs:  /78 (BP Location: Left arm, Patient Position: Sitting, BP Cuff Size: Adult)   Pulse 53   Temp 35.5 °C (95.9 °F) (Temporal)   Resp 18   Wt 99.9 kg (220 lb 3.8 oz)   SpO2 97%   BMI 32.52 kg/m²     Other Pertinent Findings:     Toxicity Assessment          2024    10:10 2024    14:00 2024    13:31   Toxicity Assessment   Treatment Site Pelvis - male Pelvis - male Pelvis - male   Anorexia Grade 0 Grade 0 Grade 0   Anxiety Grade 0 Grade 0 Grade 0   Dehydration Grade 0 Grade 0 Grade 0   Depression Grade 0 Grade 0 Grade 0   Dermatitis Radiation Grade 0 Grade 0 Grade 0   Diarrhea Grade 0 Grade 0 Grade 0   Fatigue Grade 0 Grade 0 Grade 0   Fibrosis Deep Connective Tissue Grade 0 Grade 0 Grade 0   Fracture Grade 0 Grade 0 Grade 0   Nausea Grade 0 Grade 0 Grade 0   Pain Grade 0 Grade 0 Grade 0   Treatment Related Secondary Malignancy Grade 0 Grade 0 Grade 0   Tumor Pain Grade 0 Grade 0 Grade 0   Vomiting Grade 0 Grade 0 Grade 0   Abdominal Pain Grade 0 Grade 0 Grade 0   Bloating Grade 0 Grade 0 Grade 0   Constipation Grade 0 Grade 0 Grade 0   Dysphagia Grade 0  Grade 0 Grade 0   Enterovesical Fistula Grade 0 Grade 0 Grade 0   Fecal Incontinence Grade 0 Grade 0 Grade 0   Lower Gastrointestinal Hemorrhage Grade 0 Grade 0 Grade 0   Mucositis Oral Grade 0 Grade 0 Grade 0   Proctitis Grade 0 Grade 0 Grade 0   Rectal Hemorrhage Grade 0 Grade 0 Grade 0   Rectal Pain Grade 0 Grade 0 Grade 0   Rectal Ulcer Grade 0 Grade 0 Grade 0   Small Intestinal Obstruction Grade 0 Grade 0 Grade 0   Cystitis Noninfective Grade 0 Grade 0    Hematuria Grade 0 Grade 0    Urinary Incontinence Grade 0 Grade 0    Erectile Dysfunction Grade 0     Dry Mouth Grade 0     Edema Limbs Grade 0     Gastric Fistula Grade 0 Grade 0 Grade 0   Rectal Mucositis Grade 0 Grade 0 Grade 0   Rectal Stenosis Grade 0 Grade 0 Grade 0   Bladder Spasm Grade 0 Grade 0 Grade 0   Urinary Frequency Grade 0 Grade 0 Grade 0   Urinary Retention Grade 0 Grade 0 Grade 0   Urinary Tract Obstruction Grade 0 Grade 0 Grade 0   Urinary Tract Pain Grade 0 Grade 0 Grade 0   Urinary Urgency Grade 0 Grade 0 Grade 0   Ejaculation Disorder Grade 0  Grade 0        Assessment / Plan:  The patient is tolerating radiation therapy as anticipated.  Continue per current treatment plan.       End of treatment instructions provided. The patient will see us in 4 months for follow up.

## 2024-09-16 ENCOUNTER — HOSPITAL ENCOUNTER (OUTPATIENT)
Dept: RADIATION ONCOLOGY | Facility: CLINIC | Age: 74
Setting detail: RADIATION/ONCOLOGY SERIES
Discharge: HOME | End: 2024-09-16
Payer: OTHER GOVERNMENT

## 2024-09-16 DIAGNOSIS — Z51.0 ENCOUNTER FOR ANTINEOPLASTIC RADIATION THERAPY: ICD-10-CM

## 2024-09-16 DIAGNOSIS — C61 MALIGNANT NEOPLASM OF PROSTATE (MULTI): ICD-10-CM

## 2024-09-16 LAB
RAD ONC MSQ ACTUAL FRACTIONS DELIVERED: 20
RAD ONC MSQ ACTUAL SESSION DELIVERED DOSE: 300 CGRAY
RAD ONC MSQ ACTUAL TOTAL DOSE: 6000 CGRAY
RAD ONC MSQ ELAPSED DAYS: 28
RAD ONC MSQ LAST DATE: NORMAL
RAD ONC MSQ PRESCRIBED FRACTIONAL DOSE: 300 CGRAY
RAD ONC MSQ PRESCRIBED NUMBER OF FRACTIONS: 20
RAD ONC MSQ PRESCRIBED TECHNIQUE: NORMAL
RAD ONC MSQ PRESCRIBED TOTAL DOSE: 6000 CGRAY
RAD ONC MSQ PRESCRIPTION PATTERN COMMENT: NORMAL
RAD ONC MSQ START DATE: NORMAL
RAD ONC MSQ TREATMENT COURSE NUMBER: 1
RAD ONC MSQ TREATMENT SITE: NORMAL

## 2024-09-16 PROCEDURE — 77385 HC INTENSITY-MODULATED RADIATION THERAPY (IMRT), SIMPLE: CPT | Performed by: RADIOLOGY

## 2024-09-18 ENCOUNTER — DOCUMENTATION (OUTPATIENT)
Dept: RADIATION ONCOLOGY | Facility: CLINIC | Age: 74
End: 2024-09-18
Payer: OTHER GOVERNMENT

## 2024-09-18 NOTE — PROGRESS NOTES
Radiation Oncology Treatment Summary    Patient Name:  Binu Winters  MRN:  [id number]  :  1950    Referring Provider: No ref. provider found  Primary Care Provider: No Assigned PCP Generic Provider, MD    Brief History: Binu Winters is a 74 y.o. male ECOG 0 prostate ca high risk plan for definitive hypofrac RT. 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 KALPANA 2. IPSS 1. QOL 1. PET showed possible anterior rectal wall invasion but MRI did not show this. Reviewed with radiology. We will treat prostate 60 Gy / 20 fx (SIB) and distal SVs to 44 Gy / 20 fx. PTVs are CTV + 0.5 cm in all directions, except 0.3 cm posteriorly on prostate. All IMRT, daily CBCT.  He will receive long term ADT.      Radiation Treatment Summary:    Other Treatments    Treatment Period Technique Fraction Dose Fractions Total Dose   Course 1 2024-2024  (days elapsed: 28)         Prost_SVs 2024-2024 VMAT 300 / 300 cGy  /  6000 / 6,000 cGy       Please see the patient's [facility] chart for further details regarding the radiation plan, including beam energy.    Concurrent Chemotherapy:  Treatment Plans       No treatment plans exist          CTCAE Toxicity Overview:   Toxicity Assessment          2024    10:10 2024    14:00 2024    13:31 2024    09:28   Toxicity Assessment   Treatment Site Pelvis - male Pelvis - male Pelvis - male Pelvis - male   Anorexia Grade 0 Grade 0 Grade 0 Grade 0   Anxiety Grade 0 Grade 0 Grade 0 Grade 0   Dehydration Grade 0 Grade 0 Grade 0 Grade 0   Depression Grade 0 Grade 0 Grade 0 Grade 0   Dermatitis Radiation Grade 0 Grade 0 Grade 0 Grade 0   Diarrhea Grade 0 Grade 0 Grade 0 Grade 0   Fatigue Grade 0 Grade 0 Grade 0 Grade 0   Fibrosis Deep Connective Tissue Grade 0 Grade 0 Grade 0 Grade 0   Fracture Grade 0 Grade 0 Grade 0 Grade 0   Nausea Grade 0 Grade 0 Grade 0 Grade 0    Pain Grade 0 Grade 0 Grade 0 Grade 0   Treatment Related Secondary Malignancy Grade 0 Grade 0 Grade 0 Grade 0   Tumor Pain Grade 0 Grade 0 Grade 0 Grade 0   Vomiting Grade 0 Grade 0 Grade 0 Grade 0   Abdominal Pain Grade 0 Grade 0 Grade 0    Bloating Grade 0 Grade 0 Grade 0    Constipation Grade 0 Grade 0 Grade 0    Dysphagia Grade 0 Grade 0 Grade 0    Enterovesical Fistula Grade 0 Grade 0 Grade 0    Fecal Incontinence Grade 0 Grade 0 Grade 0    Lower Gastrointestinal Hemorrhage Grade 0 Grade 0 Grade 0    Mucositis Oral Grade 0 Grade 0 Grade 0    Proctitis Grade 0 Grade 0 Grade 0    Rectal Hemorrhage Grade 0 Grade 0 Grade 0    Rectal Pain Grade 0 Grade 0 Grade 0    Rectal Ulcer Grade 0 Grade 0 Grade 0    Small Intestinal Obstruction Grade 0 Grade 0 Grade 0    Cystitis Noninfective Grade 0 Grade 0     Hematuria Grade 0 Grade 0     Urinary Incontinence Grade 0 Grade 0     Erectile Dysfunction Grade 0      Dry Mouth Grade 0      Edema Limbs Grade 0      Gastric Fistula Grade 0 Grade 0 Grade 0 Grade 0   Rectal Mucositis Grade 0 Grade 0 Grade 0 Grade 0   Rectal Stenosis Grade 0 Grade 0 Grade 0 Grade 0   Bladder Spasm Grade 0 Grade 0 Grade 0 Grade 0   Urinary Frequency Grade 0 Grade 0 Grade 0 Grade 0   Urinary Retention Grade 0 Grade 0 Grade 0 Grade 0   Urinary Tract Obstruction Grade 0 Grade 0 Grade 0 Grade 0   Urinary Tract Pain Grade 0 Grade 0 Grade 0 Grade 0   Urinary Urgency Grade 0 Grade 0 Grade 0 Grade 0   Ejaculation Disorder Grade 0  Grade 0 Grade 0     Patient Disposition: Per Dr. Bartlett patient will follow up with this office as needed.

## 2024-09-18 NOTE — PROGRESS NOTES
Patient finished 20fx to prostate on 9.16.24. Per Dr. Bartlett patient will follow up with this office as needed. Patient given and reviewed What you need to know after radiation. We reviewed side effects of radiation and how the effects can linger for 1-2 weeks. Patient instructed to call with questions or concerns. Patient verbalizes understanding with verbal teach back.

## 2024-10-21 ENCOUNTER — TELEPHONE (OUTPATIENT)
Dept: HEMATOLOGY/ONCOLOGY | Facility: CLINIC | Age: 74
End: 2024-10-21
Payer: OTHER GOVERNMENT

## 2024-10-21 NOTE — TELEPHONE ENCOUNTER
Updated and left message  To discuss with doctor at facility re care  LFTS  And need for follow up ADT

## 2025-01-06 ENCOUNTER — APPOINTMENT (OUTPATIENT)
Dept: HEMATOLOGY/ONCOLOGY | Facility: CLINIC | Age: 75
End: 2025-01-06
Payer: COMMERCIAL

## 2025-01-22 ENCOUNTER — HOSPITAL ENCOUNTER (OUTPATIENT)
Dept: RADIATION ONCOLOGY | Facility: CLINIC | Age: 75
Setting detail: RADIATION/ONCOLOGY SERIES
Discharge: HOME | End: 2025-01-22
Payer: COMMERCIAL

## 2025-01-22 VITALS
RESPIRATION RATE: 18 BRPM | TEMPERATURE: 97.7 F | DIASTOLIC BLOOD PRESSURE: 83 MMHG | BODY MASS INDEX: 33.94 KG/M2 | HEART RATE: 72 BPM | OXYGEN SATURATION: 99 % | WEIGHT: 229.83 LBS | SYSTOLIC BLOOD PRESSURE: 177 MMHG

## 2025-01-22 DIAGNOSIS — C61 MALIGNANT NEOPLASM OF PROSTATE (MULTI): ICD-10-CM

## 2025-01-22 PROCEDURE — 99213 OFFICE O/P EST LOW 20 MIN: CPT | Performed by: RADIOLOGY

## 2025-01-22 ASSESSMENT — ENCOUNTER SYMPTOMS
OCCASIONAL FEELINGS OF UNSTEADINESS: 0
LOSS OF SENSATION IN FEET: 0

## 2025-01-22 ASSESSMENT — COLUMBIA-SUICIDE SEVERITY RATING SCALE - C-SSRS
6. HAVE YOU EVER DONE ANYTHING, STARTED TO DO ANYTHING, OR PREPARED TO DO ANYTHING TO END YOUR LIFE?: NO
1. IN THE PAST MONTH, HAVE YOU WISHED YOU WERE DEAD OR WISHED YOU COULD GO TO SLEEP AND NOT WAKE UP?: NO
2. HAVE YOU ACTUALLY HAD ANY THOUGHTS OF KILLING YOURSELF?: NO

## 2025-01-22 ASSESSMENT — PAIN SCALES - GENERAL: PAINLEVEL_OUTOF10: 0-NO PAIN

## 2025-01-22 NOTE — PROGRESS NOTES
Radiation Oncology Nursing Note    Pain: The patient's current pain level was assessed.  They report currently having a pain of 0 out of 10.  They feel their pain is under control without the use of pain medications.    Review of Systems:  Review of Systems - Oncology    Education Documentation  When and How to Contact Clinic, taught by Osmar Chou RN at 1/22/2025  3:37 PM.  Learner: Patient  Readiness: Acceptance  Method: Explanation  Response: Verbalizes Understanding    Education Comments  No comments found.      Patient will follow up with this office as needed.

## 2025-01-22 NOTE — PROGRESS NOTES
Radiation Oncology Follow-Up    Patient Name:  Binu Winters  MRN:  [id number]  :  1950    Referring Provider: Mehul Bartlett MD  Primary Care Provider: No Assigned PCP Generic Provider, MD  Care Team: Patient Care Team:  No Assigned Pcp Generic Provider, MD as PCP - General (General Practice)  James Stahl MD as Consulting Physician (Hematology and Oncology)    Date of Service: 2025    SUBJECTIVE  History of Present Illness:  Binu Winters is a 74 y.o. male who was previously seen at the [facility] Department of Radiation Oncology for high risk prostate ca.      Treatment Rendered:   Other Treatments    Treatment Period Technique Fraction Dose Fractions Total Dose   Course 1 2024-2024  (days elapsed: 28)         Prost_SVs 2024-2024 VMAT 300 / 300 cGy 20 /  6000 / 6,000 cGy     He feels well today. KALPANA 0. IPSS 2. He received 1 Lupron injection in mid , we dont have the records.    Review of Systems:   Review of Systems - Oncology  General: Negative for weight changes, night sweats, fever, recent trauma.   Eyes: Negative for blurriness, eye pain, blind spots.   Ears, nose, mouth, throat: Negative for changes in hearing, changes in taste, sore throat, mouth sores.  Cardiovascular: Negative for palpitations, chest pain, tightness.   Pulmonary: Negative for shortness of breath, cough, sputum.   Gastrointestinal: Negative for indigestion, diarrhea, constipation, abdominal pain, blood in stool, nausea/vomiting.   Genitourinary: Per HPI.   Neuro: Negative for numbness, tingling, weakness, changes in speech.   Musculoskeletal: Negative for muscle pain, joint pain, edema, tenderness.   Endocrine: Negative for hot flashes.   Other: All others negative.    Performance Status:   The Karnofsky performance scale today is 90, Able to carry on normal activity; minor signs or symptoms of disease (ECOG equivalent 0).       OBJECTIVE  Vital Signs:  BP  177/83   Pulse 72   Temp 36.5 °C (97.7 °F) (Temporal)   Resp 18   Wt 104 kg (229 lb 13.3 oz)   SpO2 99%   BMI 33.94 kg/m²   Physical Exam   General: The patient appears well and is in no acute distress.   Cognition: Acceptable understanding of the essential elements of the medical situation.   Speech: Fluent and coherent.   Eyes: No conjunctival infection. Anicteric. Extraocular movements intact.   Head, ears, nose, mouth, throat: Atraumatic. Moist mucous membranes. Trachea midline.   Lymphatic: No visible cervical lymphadenopathy.   Cardiovascular: No visible jugular venous distention. Skin perfused.   Pulmonary: Breathes comfortably on room air without stridor or cough.   Abdomen: Nondistended.   Extremities: No edema or muscle wasting.   Neurologic: Cranial nerves II-XII are grossly intact. Moves all extremities. No gross focal deficits.   Psychiatric: Mood is appropriate.   Skin: No visible rash or ulcers.     Lab Results   Component Value Date    PSA 13.86 (H) 08/19/2024    PSA 50.68 (H) 05/29/2024       Lab Results   Component Value Date    TESTOSTERONE 35 (L) 08/19/2024         ASSESSMENT:   Binu Winters is a 74 y.o. male with high risk prostate ca sp definitive RT and at least 1 dose of Lupron.    PLAN:    The patient is now 4 mos status post completion of radiation therapy and is doing well without clinical evidence of disease.     I recommended he get a repeat PSA and testosterone at his facility. His PSA should continue to decline, and we would say he has no evidence of disease as long as his PSA stays within 2 ng / mL of his marija PSA. His marija PSA is not yet reached, and it will likely continue to decline from the value of 13.9 in August.     He should continue on ADT for 18 months total, per NCCN guidelines. He has currently received ~9  months, per his recollection.     The patient has no apparent chronic toxicity secondary to radiation therapy.     I recommend the patient follow-up with our  team PRN. We will send our records to his facility so they can manage his care going forward.    The time spent in follow up was 20 minutes.    Mehul Bartlett MD MS  Department of Radiation Oncology

## 2025-07-31 ENCOUNTER — LAB REQUISITION (OUTPATIENT)
Dept: LAB | Facility: HOSPITAL | Age: 75
End: 2025-07-31
Payer: COMMERCIAL

## 2025-07-31 DIAGNOSIS — R53.83 OTHER FATIGUE: ICD-10-CM

## 2025-07-31 LAB
ALBUMIN SERPL BCP-MCNC: 4 G/DL (ref 3.4–5)
ALP SERPL-CCNC: 61 U/L (ref 33–136)
ALT SERPL W P-5'-P-CCNC: 11 U/L (ref 10–52)
ANION GAP SERPL CALC-SCNC: 12 MMOL/L (ref 10–20)
AST SERPL W P-5'-P-CCNC: 15 U/L (ref 9–39)
BASOPHILS # BLD AUTO: 0.02 X10*3/UL (ref 0–0.1)
BASOPHILS NFR BLD AUTO: 0.5 %
BILIRUB SERPL-MCNC: 0.6 MG/DL (ref 0–1.2)
BUN SERPL-MCNC: 18 MG/DL (ref 6–23)
CALCIUM SERPL-MCNC: 9 MG/DL (ref 8.6–10.3)
CHLORIDE SERPL-SCNC: 108 MMOL/L (ref 98–107)
CO2 SERPL-SCNC: 25 MMOL/L (ref 21–32)
CREAT SERPL-MCNC: 1.36 MG/DL (ref 0.5–1.3)
EGFRCR SERPLBLD CKD-EPI 2021: 55 ML/MIN/1.73M*2
EOSINOPHIL # BLD AUTO: 0.05 X10*3/UL (ref 0–0.4)
EOSINOPHIL NFR BLD AUTO: 1.3 %
ERYTHROCYTE [DISTWIDTH] IN BLOOD BY AUTOMATED COUNT: 15.4 % (ref 11.5–14.5)
GLUCOSE SERPL-MCNC: 86 MG/DL (ref 74–99)
HCT VFR BLD AUTO: 32.5 % (ref 41–52)
HGB BLD-MCNC: 10.4 G/DL (ref 13.5–17.5)
IMM GRANULOCYTES # BLD AUTO: 0.01 X10*3/UL (ref 0–0.5)
IMM GRANULOCYTES NFR BLD AUTO: 0.3 % (ref 0–0.9)
LYMPHOCYTES # BLD AUTO: 0.93 X10*3/UL (ref 0.8–3)
LYMPHOCYTES NFR BLD AUTO: 23.4 %
MCH RBC QN AUTO: 27.6 PG (ref 26–34)
MCHC RBC AUTO-ENTMCNC: 32 G/DL (ref 32–36)
MCV RBC AUTO: 86 FL (ref 80–100)
MONOCYTES # BLD AUTO: 0.35 X10*3/UL (ref 0.05–0.8)
MONOCYTES NFR BLD AUTO: 8.8 %
NEUTROPHILS # BLD AUTO: 2.62 X10*3/UL (ref 1.6–5.5)
NEUTROPHILS NFR BLD AUTO: 65.7 %
NRBC BLD-RTO: 0 /100 WBCS (ref 0–0)
PLATELET # BLD AUTO: 165 X10*3/UL (ref 150–450)
POTASSIUM SERPL-SCNC: 4.1 MMOL/L (ref 3.5–5.3)
PROT SERPL-MCNC: 6.8 G/DL (ref 6.4–8.2)
RBC # BLD AUTO: 3.77 X10*6/UL (ref 4.5–5.9)
SODIUM SERPL-SCNC: 141 MMOL/L (ref 136–145)
WBC # BLD AUTO: 4 X10*3/UL (ref 4.4–11.3)

## 2025-07-31 PROCEDURE — 85025 COMPLETE CBC W/AUTO DIFF WBC: CPT

## 2025-07-31 PROCEDURE — 80053 COMPREHEN METABOLIC PANEL: CPT
